# Patient Record
Sex: MALE | NOT HISPANIC OR LATINO
[De-identification: names, ages, dates, MRNs, and addresses within clinical notes are randomized per-mention and may not be internally consistent; named-entity substitution may affect disease eponyms.]

---

## 2017-01-03 ENCOUNTER — RX RENEWAL (OUTPATIENT)
Age: 62
End: 2017-01-03

## 2017-04-13 ENCOUNTER — APPOINTMENT (OUTPATIENT)
Dept: ENDOCRINOLOGY | Facility: CLINIC | Age: 62
End: 2017-04-13

## 2017-04-13 VITALS
BODY MASS INDEX: 28.44 KG/M2 | SYSTOLIC BLOOD PRESSURE: 124 MMHG | DIASTOLIC BLOOD PRESSURE: 88 MMHG | HEIGHT: 72 IN | OXYGEN SATURATION: 98 % | WEIGHT: 210 LBS | HEART RATE: 88 BPM

## 2017-04-13 LAB
GLUCOSE BLDC GLUCOMTR-MCNC: 136
HBA1C MFR BLD HPLC: 6.8

## 2017-06-28 ENCOUNTER — RX RENEWAL (OUTPATIENT)
Age: 62
End: 2017-06-28

## 2017-07-17 ENCOUNTER — RX RENEWAL (OUTPATIENT)
Age: 62
End: 2017-07-17

## 2017-08-17 ENCOUNTER — RX RENEWAL (OUTPATIENT)
Age: 62
End: 2017-08-17

## 2017-08-17 ENCOUNTER — APPOINTMENT (OUTPATIENT)
Dept: ENDOCRINOLOGY | Facility: CLINIC | Age: 62
End: 2017-08-17
Payer: COMMERCIAL

## 2017-08-17 VITALS
OXYGEN SATURATION: 98 % | WEIGHT: 208 LBS | HEIGHT: 72 IN | SYSTOLIC BLOOD PRESSURE: 120 MMHG | DIASTOLIC BLOOD PRESSURE: 72 MMHG | BODY MASS INDEX: 28.17 KG/M2 | HEART RATE: 83 BPM

## 2017-08-17 DIAGNOSIS — Z83.3 FAMILY HISTORY OF DIABETES MELLITUS: ICD-10-CM

## 2017-08-17 PROCEDURE — 99214 OFFICE O/P EST MOD 30 MIN: CPT | Mod: 25

## 2017-08-17 PROCEDURE — 82962 GLUCOSE BLOOD TEST: CPT

## 2017-08-17 PROCEDURE — 83036 HEMOGLOBIN GLYCOSYLATED A1C: CPT | Mod: QW

## 2017-08-18 LAB
ALBUMIN SERPL ELPH-MCNC: 4.5 G/DL
ALP BLD-CCNC: 70 U/L
ALT SERPL-CCNC: 25 U/L
ANION GAP SERPL CALC-SCNC: 18 MMOL/L
AST SERPL-CCNC: 19 U/L
BILIRUB SERPL-MCNC: 1.1 MG/DL
BUN SERPL-MCNC: 15 MG/DL
CALCIUM SERPL-MCNC: 9.6 MG/DL
CHLORIDE SERPL-SCNC: 100 MMOL/L
CHOLEST SERPL-MCNC: 143 MG/DL
CHOLEST/HDLC SERPL: 4 RATIO
CO2 SERPL-SCNC: 20 MMOL/L
CREAT SERPL-MCNC: 0.9 MG/DL
GLUCOSE BLDC GLUCOMTR-MCNC: 215
GLUCOSE SERPL-MCNC: 148 MG/DL
HBA1C MFR BLD HPLC: 7
HDLC SERPL-MCNC: 36 MG/DL
LDLC SERPL CALC-MCNC: 83 MG/DL
POTASSIUM SERPL-SCNC: 4.5 MMOL/L
PROT SERPL-MCNC: 7.9 G/DL
SODIUM SERPL-SCNC: 138 MMOL/L
TRIGL SERPL-MCNC: 119 MG/DL

## 2017-09-07 ENCOUNTER — APPOINTMENT (OUTPATIENT)
Dept: ORTHOPEDIC SURGERY | Facility: CLINIC | Age: 62
End: 2017-09-07
Payer: COMMERCIAL

## 2017-09-07 VITALS
WEIGHT: 209 LBS | BODY MASS INDEX: 29.26 KG/M2 | HEART RATE: 83 BPM | HEIGHT: 71 IN | DIASTOLIC BLOOD PRESSURE: 83 MMHG | SYSTOLIC BLOOD PRESSURE: 127 MMHG

## 2017-09-07 DIAGNOSIS — M47.816 SPONDYLOSIS W/OUT MYELOPATHY OR RADICULOPATHY, LUMBAR REGION: ICD-10-CM

## 2017-09-07 DIAGNOSIS — K81.0 ACUTE CHOLECYSTITIS: ICD-10-CM

## 2017-09-07 DIAGNOSIS — M16.12 UNILATERAL PRIMARY OSTEOARTHRITIS, LEFT HIP: ICD-10-CM

## 2017-09-07 PROCEDURE — 73502 X-RAY EXAM HIP UNI 2-3 VIEWS: CPT

## 2017-09-07 PROCEDURE — 72100 X-RAY EXAM L-S SPINE 2/3 VWS: CPT

## 2017-09-07 PROCEDURE — 99204 OFFICE O/P NEW MOD 45 MIN: CPT

## 2017-10-03 ENCOUNTER — OUTPATIENT (OUTPATIENT)
Dept: OUTPATIENT SERVICES | Facility: HOSPITAL | Age: 62
LOS: 1 days | End: 2017-10-03
Payer: COMMERCIAL

## 2017-10-03 VITALS
SYSTOLIC BLOOD PRESSURE: 143 MMHG | TEMPERATURE: 98 F | DIASTOLIC BLOOD PRESSURE: 79 MMHG | RESPIRATION RATE: 20 BRPM | HEIGHT: 71 IN | WEIGHT: 205.91 LBS | HEART RATE: 71 BPM | OXYGEN SATURATION: 97 %

## 2017-10-03 DIAGNOSIS — E11.9 TYPE 2 DIABETES MELLITUS WITHOUT COMPLICATIONS: ICD-10-CM

## 2017-10-03 DIAGNOSIS — Z90.49 ACQUIRED ABSENCE OF OTHER SPECIFIED PARTS OF DIGESTIVE TRACT: Chronic | ICD-10-CM

## 2017-10-03 DIAGNOSIS — M17.12 UNILATERAL PRIMARY OSTEOARTHRITIS, LEFT KNEE: ICD-10-CM

## 2017-10-03 DIAGNOSIS — Z01.818 ENCOUNTER FOR OTHER PREPROCEDURAL EXAMINATION: ICD-10-CM

## 2017-10-03 DIAGNOSIS — M16.12 UNILATERAL PRIMARY OSTEOARTHRITIS, LEFT HIP: ICD-10-CM

## 2017-10-03 DIAGNOSIS — Z98.890 OTHER SPECIFIED POSTPROCEDURAL STATES: Chronic | ICD-10-CM

## 2017-10-03 DIAGNOSIS — I10 ESSENTIAL (PRIMARY) HYPERTENSION: ICD-10-CM

## 2017-10-03 DIAGNOSIS — Z96.649 PRESENCE OF UNSPECIFIED ARTIFICIAL HIP JOINT: Chronic | ICD-10-CM

## 2017-10-03 LAB
ANION GAP SERPL CALC-SCNC: 14 MMOL/L — SIGNIFICANT CHANGE UP (ref 5–17)
BLD GP AB SCN SERPL QL: NEGATIVE — SIGNIFICANT CHANGE UP
BUN SERPL-MCNC: 16 MG/DL — SIGNIFICANT CHANGE UP (ref 7–23)
CALCIUM SERPL-MCNC: 9.6 MG/DL — SIGNIFICANT CHANGE UP (ref 8.4–10.5)
CHLORIDE SERPL-SCNC: 101 MMOL/L — SIGNIFICANT CHANGE UP (ref 96–108)
CO2 SERPL-SCNC: 22 MMOL/L — SIGNIFICANT CHANGE UP (ref 22–31)
CREAT SERPL-MCNC: 1.13 MG/DL — SIGNIFICANT CHANGE UP (ref 0.5–1.3)
GLUCOSE SERPL-MCNC: 216 MG/DL — HIGH (ref 70–99)
HBA1C BLD-MCNC: 7.5 % — HIGH (ref 4–5.6)
HCT VFR BLD CALC: 43.2 % — SIGNIFICANT CHANGE UP (ref 39–50)
HGB BLD-MCNC: 14.7 G/DL — SIGNIFICANT CHANGE UP (ref 13–17)
MCHC RBC-ENTMCNC: 28.8 PG — SIGNIFICANT CHANGE UP (ref 27–34)
MCHC RBC-ENTMCNC: 34 GM/DL — SIGNIFICANT CHANGE UP (ref 32–36)
MCV RBC AUTO: 84.7 FL — SIGNIFICANT CHANGE UP (ref 80–100)
MRSA PCR RESULT.: SIGNIFICANT CHANGE UP
PLATELET # BLD AUTO: 249 K/UL — SIGNIFICANT CHANGE UP (ref 150–400)
POTASSIUM SERPL-MCNC: 4 MMOL/L — SIGNIFICANT CHANGE UP (ref 3.5–5.3)
POTASSIUM SERPL-SCNC: 4 MMOL/L — SIGNIFICANT CHANGE UP (ref 3.5–5.3)
RBC # BLD: 5.1 M/UL — SIGNIFICANT CHANGE UP (ref 4.2–5.8)
RBC # FLD: 12.3 % — SIGNIFICANT CHANGE UP (ref 10.3–14.5)
RH IG SCN BLD-IMP: POSITIVE — SIGNIFICANT CHANGE UP
S AUREUS DNA NOSE QL NAA+PROBE: SIGNIFICANT CHANGE UP
SODIUM SERPL-SCNC: 137 MMOL/L — SIGNIFICANT CHANGE UP (ref 135–145)
WBC # BLD: 5.54 K/UL — SIGNIFICANT CHANGE UP (ref 3.8–10.5)
WBC # FLD AUTO: 5.54 K/UL — SIGNIFICANT CHANGE UP (ref 3.8–10.5)

## 2017-10-03 PROCEDURE — 85027 COMPLETE CBC AUTOMATED: CPT

## 2017-10-03 PROCEDURE — 86850 RBC ANTIBODY SCREEN: CPT

## 2017-10-03 PROCEDURE — 80048 BASIC METABOLIC PNL TOTAL CA: CPT

## 2017-10-03 PROCEDURE — 87641 MR-STAPH DNA AMP PROBE: CPT

## 2017-10-03 PROCEDURE — G0463: CPT

## 2017-10-03 PROCEDURE — 86900 BLOOD TYPING SEROLOGIC ABO: CPT

## 2017-10-03 PROCEDURE — 83036 HEMOGLOBIN GLYCOSYLATED A1C: CPT

## 2017-10-03 PROCEDURE — 87640 STAPH A DNA AMP PROBE: CPT

## 2017-10-03 PROCEDURE — 86901 BLOOD TYPING SEROLOGIC RH(D): CPT

## 2017-10-03 RX ORDER — SODIUM CHLORIDE 9 MG/ML
3 INJECTION INTRAMUSCULAR; INTRAVENOUS; SUBCUTANEOUS EVERY 8 HOURS
Qty: 0 | Refills: 0 | Status: DISCONTINUED | OUTPATIENT
Start: 2017-10-21 | End: 2017-10-21

## 2017-10-03 RX ORDER — TRAMADOL HYDROCHLORIDE 50 MG/1
50 TABLET ORAL ONCE
Qty: 0 | Refills: 0 | Status: DISCONTINUED | OUTPATIENT
Start: 2017-10-21 | End: 2017-10-21

## 2017-10-03 RX ORDER — PANTOPRAZOLE SODIUM 20 MG/1
40 TABLET, DELAYED RELEASE ORAL ONCE
Qty: 0 | Refills: 0 | Status: COMPLETED | OUTPATIENT
Start: 2017-10-21 | End: 2017-10-21

## 2017-10-03 RX ORDER — GABAPENTIN 400 MG/1
300 CAPSULE ORAL DAILY
Qty: 0 | Refills: 0 | Status: DISCONTINUED | OUTPATIENT
Start: 2017-10-21 | End: 2017-10-21

## 2017-10-03 RX ORDER — CEFAZOLIN SODIUM 1 G
2000 VIAL (EA) INJECTION ONCE
Qty: 0 | Refills: 0 | Status: DISCONTINUED | OUTPATIENT
Start: 2017-10-21 | End: 2017-10-23

## 2017-10-03 RX ORDER — ACETAMINOPHEN 500 MG
975 TABLET ORAL ONCE
Qty: 0 | Refills: 0 | Status: COMPLETED | OUTPATIENT
Start: 2017-10-21 | End: 2017-10-21

## 2017-10-03 NOTE — H&P PST ADULT - PROBLEM SELECTOR PLAN 1
Total left Hip Replacement  Pre- Op instructions discussed  CBC,BMP,Type and Screen,HgA1c,MRSA/MSSA  Pre- emptive ordered

## 2017-10-03 NOTE — H&P PST ADULT - HISTORY OF PRESENT ILLNESS
61 yo male with  PMH of HTN, HLD , DM Type 2 ( last HgA1c - 7.0 ), OA ( h/o Rt Hip replacement ). Pt c/o left hip pain ,worsening over the past few months, Currently his pain is 7/10 the scale of 0-10. Presents to PST for scheduled Left Anterior total Hip Replacement on 10/21/2017.

## 2017-10-03 NOTE — H&P PST ADULT - RS GEN PE MLT RESP DETAILS PC
clear to auscultation bilaterally/airway patent/good air movement/normal/respirations non-labored/breath sounds equal

## 2017-10-03 NOTE — H&P PST ADULT - PSH
History of hip replacement  Right- 2009  S/P cholecystectomy  laparoscopic - 2016  S/P inguinal hernia repair  2008

## 2017-10-03 NOTE — H&P PST ADULT - PMH
HLD (hyperlipidemia)    Hypertension    Type 2 diabetes mellitus    Unilateral primary osteoarthritis, left hip

## 2017-10-03 NOTE — H&P PST ADULT - NSANTHOSAYNRD_GEN_A_CORE
No. SIERRA screening performed.  STOP BANG Legend: 0-2 = LOW Risk; 3-4 = INTERMEDIATE Risk; 5-8 = HIGH Risk

## 2017-10-18 ENCOUNTER — OTHER (OUTPATIENT)
Age: 62
End: 2017-10-18

## 2017-10-20 ENCOUNTER — FORM ENCOUNTER (OUTPATIENT)
Age: 62
End: 2017-10-20

## 2017-10-21 ENCOUNTER — INPATIENT (INPATIENT)
Facility: HOSPITAL | Age: 62
LOS: 1 days | Discharge: HOME | DRG: 470 | End: 2017-10-23
Attending: ORTHOPAEDIC SURGERY | Admitting: ORTHOPAEDIC SURGERY
Payer: COMMERCIAL

## 2017-10-21 ENCOUNTER — APPOINTMENT (OUTPATIENT)
Dept: ORTHOPEDIC SURGERY | Facility: HOSPITAL | Age: 62
End: 2017-10-21

## 2017-10-21 ENCOUNTER — RESULT REVIEW (OUTPATIENT)
Age: 62
End: 2017-10-21

## 2017-10-21 VITALS
HEART RATE: 73 BPM | TEMPERATURE: 98 F | WEIGHT: 205.91 LBS | RESPIRATION RATE: 18 BRPM | HEIGHT: 71 IN | DIASTOLIC BLOOD PRESSURE: 86 MMHG | SYSTOLIC BLOOD PRESSURE: 136 MMHG | OXYGEN SATURATION: 97 %

## 2017-10-21 DIAGNOSIS — Z96.649 PRESENCE OF UNSPECIFIED ARTIFICIAL HIP JOINT: Chronic | ICD-10-CM

## 2017-10-21 DIAGNOSIS — Z98.890 OTHER SPECIFIED POSTPROCEDURAL STATES: Chronic | ICD-10-CM

## 2017-10-21 DIAGNOSIS — M17.12 UNILATERAL PRIMARY OSTEOARTHRITIS, LEFT KNEE: ICD-10-CM

## 2017-10-21 DIAGNOSIS — Z90.49 ACQUIRED ABSENCE OF OTHER SPECIFIED PARTS OF DIGESTIVE TRACT: Chronic | ICD-10-CM

## 2017-10-21 LAB
ANION GAP SERPL CALC-SCNC: 10 MMOL/L — SIGNIFICANT CHANGE UP (ref 5–17)
BUN SERPL-MCNC: 15 MG/DL — SIGNIFICANT CHANGE UP (ref 7–23)
CALCIUM SERPL-MCNC: 8.1 MG/DL — LOW (ref 8.4–10.5)
CHLORIDE SERPL-SCNC: 101 MMOL/L — SIGNIFICANT CHANGE UP (ref 96–108)
CO2 SERPL-SCNC: 24 MMOL/L — SIGNIFICANT CHANGE UP (ref 22–31)
CREAT SERPL-MCNC: 0.79 MG/DL — SIGNIFICANT CHANGE UP (ref 0.5–1.3)
GLUCOSE BLDC GLUCOMTR-MCNC: 135 MG/DL — HIGH (ref 70–99)
GLUCOSE BLDC GLUCOMTR-MCNC: 209 MG/DL — HIGH (ref 70–99)
GLUCOSE BLDC GLUCOMTR-MCNC: 287 MG/DL — HIGH (ref 70–99)
GLUCOSE SERPL-MCNC: 201 MG/DL — HIGH (ref 70–99)
HCT VFR BLD CALC: 39.2 % — SIGNIFICANT CHANGE UP (ref 39–50)
HGB BLD-MCNC: 13.4 G/DL — SIGNIFICANT CHANGE UP (ref 13–17)
MCHC RBC-ENTMCNC: 30.9 PG — SIGNIFICANT CHANGE UP (ref 27–34)
MCHC RBC-ENTMCNC: 34.2 GM/DL — SIGNIFICANT CHANGE UP (ref 32–36)
MCV RBC AUTO: 90.3 FL — SIGNIFICANT CHANGE UP (ref 80–100)
PLATELET # BLD AUTO: 220 K/UL — SIGNIFICANT CHANGE UP (ref 150–400)
POTASSIUM SERPL-MCNC: 4.7 MMOL/L — SIGNIFICANT CHANGE UP (ref 3.5–5.3)
POTASSIUM SERPL-SCNC: 4.7 MMOL/L — SIGNIFICANT CHANGE UP (ref 3.5–5.3)
RBC # BLD: 4.35 M/UL — SIGNIFICANT CHANGE UP (ref 4.2–5.8)
RBC # FLD: 11.3 % — SIGNIFICANT CHANGE UP (ref 10.3–14.5)
SODIUM SERPL-SCNC: 135 MMOL/L — SIGNIFICANT CHANGE UP (ref 135–145)
WBC # BLD: 5.6 K/UL — SIGNIFICANT CHANGE UP (ref 3.8–10.5)
WBC # FLD AUTO: 5.6 K/UL — SIGNIFICANT CHANGE UP (ref 3.8–10.5)

## 2017-10-21 PROCEDURE — 27130 TOTAL HIP ARTHROPLASTY: CPT | Mod: LT

## 2017-10-21 PROCEDURE — 88311 DECALCIFY TISSUE: CPT | Mod: 26

## 2017-10-21 PROCEDURE — 88304 TISSUE EXAM BY PATHOLOGIST: CPT | Mod: 26

## 2017-10-21 PROCEDURE — 72170 X-RAY EXAM OF PELVIS: CPT | Mod: 26

## 2017-10-21 RX ORDER — CEFAZOLIN SODIUM 1 G
2000 VIAL (EA) INJECTION EVERY 6 HOURS
Qty: 0 | Refills: 0 | Status: COMPLETED | OUTPATIENT
Start: 2017-10-21 | End: 2017-10-21

## 2017-10-21 RX ORDER — DEXTROSE 50 % IN WATER 50 %
1 SYRINGE (ML) INTRAVENOUS ONCE
Qty: 0 | Refills: 0 | Status: DISCONTINUED | OUTPATIENT
Start: 2017-10-21 | End: 2017-10-23

## 2017-10-21 RX ORDER — KETOROLAC TROMETHAMINE 30 MG/ML
15 SYRINGE (ML) INJECTION EVERY 8 HOURS
Qty: 0 | Refills: 0 | Status: DISCONTINUED | OUTPATIENT
Start: 2017-10-22 | End: 2017-10-22

## 2017-10-21 RX ORDER — DEXTROSE 50 % IN WATER 50 %
25 SYRINGE (ML) INTRAVENOUS ONCE
Qty: 0 | Refills: 0 | Status: DISCONTINUED | OUTPATIENT
Start: 2017-10-21 | End: 2017-10-23

## 2017-10-21 RX ORDER — INSULIN LISPRO 100/ML
VIAL (ML) SUBCUTANEOUS
Qty: 0 | Refills: 0 | Status: DISCONTINUED | OUTPATIENT
Start: 2017-10-21 | End: 2017-10-23

## 2017-10-21 RX ORDER — DOCUSATE SODIUM 100 MG
100 CAPSULE ORAL THREE TIMES A DAY
Qty: 0 | Refills: 0 | Status: DISCONTINUED | OUTPATIENT
Start: 2017-10-21 | End: 2017-10-23

## 2017-10-21 RX ORDER — GLUCAGON INJECTION, SOLUTION 0.5 MG/.1ML
1 INJECTION, SOLUTION SUBCUTANEOUS ONCE
Qty: 0 | Refills: 0 | Status: DISCONTINUED | OUTPATIENT
Start: 2017-10-21 | End: 2017-10-23

## 2017-10-21 RX ORDER — SIMVASTATIN 20 MG/1
10 TABLET, FILM COATED ORAL AT BEDTIME
Qty: 0 | Refills: 0 | Status: DISCONTINUED | OUTPATIENT
Start: 2017-10-21 | End: 2017-10-23

## 2017-10-21 RX ORDER — INFLUENZA VIRUS VACCINE 15; 15; 15; 15 UG/.5ML; UG/.5ML; UG/.5ML; UG/.5ML
0.5 SUSPENSION INTRAMUSCULAR ONCE
Qty: 0 | Refills: 0 | Status: DISCONTINUED | OUTPATIENT
Start: 2017-10-21 | End: 2017-10-23

## 2017-10-21 RX ORDER — OXYCODONE HYDROCHLORIDE 5 MG/1
10 TABLET ORAL EVERY 4 HOURS
Qty: 0 | Refills: 0 | Status: DISCONTINUED | OUTPATIENT
Start: 2017-10-21 | End: 2017-10-23

## 2017-10-21 RX ORDER — DEXTROSE 50 % IN WATER 50 %
12.5 SYRINGE (ML) INTRAVENOUS ONCE
Qty: 0 | Refills: 0 | Status: DISCONTINUED | OUTPATIENT
Start: 2017-10-21 | End: 2017-10-23

## 2017-10-21 RX ORDER — SODIUM CHLORIDE 9 MG/ML
1000 INJECTION, SOLUTION INTRAVENOUS
Qty: 0 | Refills: 0 | Status: DISCONTINUED | OUTPATIENT
Start: 2017-10-21 | End: 2017-10-23

## 2017-10-21 RX ORDER — METFORMIN HYDROCHLORIDE 850 MG/1
1 TABLET ORAL
Qty: 0 | Refills: 0 | COMMUNITY

## 2017-10-21 RX ORDER — ASPIRIN/CALCIUM CARB/MAGNESIUM 324 MG
325 TABLET ORAL
Qty: 0 | Refills: 0 | Status: DISCONTINUED | OUTPATIENT
Start: 2017-10-21 | End: 2017-10-23

## 2017-10-21 RX ORDER — PANTOPRAZOLE SODIUM 20 MG/1
40 TABLET, DELAYED RELEASE ORAL DAILY
Qty: 0 | Refills: 0 | Status: DISCONTINUED | OUTPATIENT
Start: 2017-10-21 | End: 2017-10-23

## 2017-10-21 RX ORDER — INSULIN LISPRO 100/ML
VIAL (ML) SUBCUTANEOUS AT BEDTIME
Qty: 0 | Refills: 0 | Status: DISCONTINUED | OUTPATIENT
Start: 2017-10-21 | End: 2017-10-23

## 2017-10-21 RX ORDER — OXYCODONE HYDROCHLORIDE 5 MG/1
5 TABLET ORAL EVERY 4 HOURS
Qty: 0 | Refills: 0 | Status: DISCONTINUED | OUTPATIENT
Start: 2017-10-21 | End: 2017-10-23

## 2017-10-21 RX ORDER — TRAMADOL HYDROCHLORIDE 50 MG/1
50 TABLET ORAL EVERY 8 HOURS
Qty: 0 | Refills: 0 | Status: DISCONTINUED | OUTPATIENT
Start: 2017-10-21 | End: 2017-10-23

## 2017-10-21 RX ORDER — SIMVASTATIN 20 MG/1
1 TABLET, FILM COATED ORAL
Qty: 0 | Refills: 0 | COMMUNITY

## 2017-10-21 RX ORDER — CELECOXIB 200 MG/1
200 CAPSULE ORAL
Qty: 0 | Refills: 0 | Status: DISCONTINUED | OUTPATIENT
Start: 2017-10-23 | End: 2017-10-23

## 2017-10-21 RX ORDER — ONDANSETRON 8 MG/1
4 TABLET, FILM COATED ORAL EVERY 6 HOURS
Qty: 0 | Refills: 0 | Status: DISCONTINUED | OUTPATIENT
Start: 2017-10-21 | End: 2017-10-23

## 2017-10-21 RX ORDER — ACETAMINOPHEN 500 MG
650 TABLET ORAL EVERY 8 HOURS
Qty: 0 | Refills: 0 | Status: DISCONTINUED | OUTPATIENT
Start: 2017-10-21 | End: 2017-10-23

## 2017-10-21 RX ORDER — SENNA PLUS 8.6 MG/1
2 TABLET ORAL AT BEDTIME
Qty: 0 | Refills: 0 | Status: DISCONTINUED | OUTPATIENT
Start: 2017-10-21 | End: 2017-10-23

## 2017-10-21 RX ORDER — SODIUM CHLORIDE 9 MG/ML
1000 INJECTION, SOLUTION INTRAVENOUS ONCE
Qty: 0 | Refills: 0 | Status: COMPLETED | OUTPATIENT
Start: 2017-10-21 | End: 2017-10-22

## 2017-10-21 RX ORDER — HYDROMORPHONE HYDROCHLORIDE 2 MG/ML
0.5 INJECTION INTRAMUSCULAR; INTRAVENOUS; SUBCUTANEOUS EVERY 4 HOURS
Qty: 0 | Refills: 0 | Status: DISCONTINUED | OUTPATIENT
Start: 2017-10-21 | End: 2017-10-23

## 2017-10-21 RX ORDER — LIDOCAINE HCL 20 MG/ML
0.2 VIAL (ML) INJECTION ONCE
Qty: 0 | Refills: 0 | Status: DISCONTINUED | OUTPATIENT
Start: 2017-10-21 | End: 2017-10-21

## 2017-10-21 RX ORDER — SODIUM CHLORIDE 9 MG/ML
1000 INJECTION, SOLUTION INTRAVENOUS ONCE
Qty: 0 | Refills: 0 | Status: COMPLETED | OUTPATIENT
Start: 2017-10-21 | End: 2017-10-21

## 2017-10-21 RX ORDER — FERROUS SULFATE 325(65) MG
325 TABLET ORAL DAILY
Qty: 0 | Refills: 0 | Status: DISCONTINUED | OUTPATIENT
Start: 2017-10-21 | End: 2017-10-23

## 2017-10-21 RX ADMIN — GABAPENTIN 300 MILLIGRAM(S): 400 CAPSULE ORAL at 06:14

## 2017-10-21 RX ADMIN — Medication 975 MILLIGRAM(S): at 06:14

## 2017-10-21 RX ADMIN — SODIUM CHLORIDE 1000 MILLILITER(S): 9 INJECTION, SOLUTION INTRAVENOUS at 11:24

## 2017-10-21 RX ADMIN — SIMVASTATIN 10 MILLIGRAM(S): 20 TABLET, FILM COATED ORAL at 21:38

## 2017-10-21 RX ADMIN — TRAMADOL HYDROCHLORIDE 50 MILLIGRAM(S): 50 TABLET ORAL at 07:41

## 2017-10-21 RX ADMIN — Medication 100 MILLIGRAM(S): at 21:38

## 2017-10-21 RX ADMIN — Medication 5 MILLIGRAM(S): at 17:15

## 2017-10-21 RX ADMIN — Medication 3: at 17:14

## 2017-10-21 RX ADMIN — Medication 1 TABLET(S): at 17:15

## 2017-10-21 RX ADMIN — TRAMADOL HYDROCHLORIDE 50 MILLIGRAM(S): 50 TABLET ORAL at 17:15

## 2017-10-21 RX ADMIN — Medication 100 MILLIGRAM(S): at 13:53

## 2017-10-21 RX ADMIN — PANTOPRAZOLE SODIUM 40 MILLIGRAM(S): 20 TABLET, DELAYED RELEASE ORAL at 06:14

## 2017-10-21 RX ADMIN — Medication 2: at 12:00

## 2017-10-21 RX ADMIN — SODIUM CHLORIDE 3 MILLILITER(S): 9 INJECTION INTRAMUSCULAR; INTRAVENOUS; SUBCUTANEOUS at 06:16

## 2017-10-21 RX ADMIN — Medication 325 MILLIGRAM(S): at 21:38

## 2017-10-21 NOTE — PHYSICAL THERAPY INITIAL EVALUATION ADULT - ADDITIONAL COMMENTS
62 year old male who PTA was living in a 2nd floor apartment,. reports 5 steps to enter building, and additional 15 steps to 2nd floor + hand rails living with spouse,. PTA pt was independent in all functional mobility and all ADLs, no AD.

## 2017-10-21 NOTE — CHART NOTE - NSCHARTNOTEFT_GEN_A_CORE
10-21-17 @ 11:56    Pt comfortable.  Pain well controlled.  No SOB, No C/P, No N/V.  T(C): 36.5 (10-21-17 @ 10:20), Max: 36.6 (10-21-17 @ 05:58)  HR: 62 (10-21-17 @ 11:45) (62 - 80)  BP: 133/84 (10-21-17 @ 11:45) (107/59 - 138/83)  RR: 17 (10-21-17 @ 11:45) (14 - 18)  SpO2: 98% (10-21-17 @ 11:45) (97% - 100%)    Left hip dressing clean/dry/intact.  +DF/PF.  +Distal Pulses.   Motor/Sensory function grossly intact.  Calves soft/NT with venodynes  intact.      Postop Xrays show good hardware alignment.    Pt s/p L GAGANDEEP  -Analgesia  -Cont to Monitor  -DVT Prophylaxis  -OOB w/ PT    ALFREDA PalaciosC  [id number]

## 2017-10-21 NOTE — PHYSICAL THERAPY INITIAL EVALUATION ADULT - CRITERIA FOR SKILLED THERAPEUTIC INTERVENTIONS
risk reduction/prevention/anticipated discharge recommendation/functional limitations in following categories/anticipated equipment needs at discharge/impairments found/therapy frequency/rehab potential/predicted duration of therapy intervention

## 2017-10-21 NOTE — BRIEF OPERATIVE NOTE - PROCEDURE
<<-----Click on this checkbox to enter Procedure Total hip replacement  10/21/2017  LEFT ANTERIOR TOTAL HIP REPLACEMENT  Active  AVARGAS2

## 2017-10-22 LAB
ANION GAP SERPL CALC-SCNC: 12 MMOL/L — SIGNIFICANT CHANGE UP (ref 5–17)
BUN SERPL-MCNC: 13 MG/DL — SIGNIFICANT CHANGE UP (ref 7–23)
CALCIUM SERPL-MCNC: 8.2 MG/DL — LOW (ref 8.4–10.5)
CHLORIDE SERPL-SCNC: 99 MMOL/L — SIGNIFICANT CHANGE UP (ref 96–108)
CO2 SERPL-SCNC: 22 MMOL/L — SIGNIFICANT CHANGE UP (ref 22–31)
CREAT SERPL-MCNC: 0.69 MG/DL — SIGNIFICANT CHANGE UP (ref 0.5–1.3)
GLUCOSE BLDC GLUCOMTR-MCNC: 167 MG/DL — HIGH (ref 70–99)
GLUCOSE BLDC GLUCOMTR-MCNC: 173 MG/DL — HIGH (ref 70–99)
GLUCOSE BLDC GLUCOMTR-MCNC: 181 MG/DL — HIGH (ref 70–99)
GLUCOSE BLDC GLUCOMTR-MCNC: 190 MG/DL — HIGH (ref 70–99)
GLUCOSE SERPL-MCNC: 189 MG/DL — HIGH (ref 70–99)
HCT VFR BLD CALC: 32.3 % — LOW (ref 39–50)
HGB BLD-MCNC: 11.1 G/DL — LOW (ref 13–17)
MCHC RBC-ENTMCNC: 29.4 PG — SIGNIFICANT CHANGE UP (ref 27–34)
MCHC RBC-ENTMCNC: 34.4 GM/DL — SIGNIFICANT CHANGE UP (ref 32–36)
MCV RBC AUTO: 85.4 FL — SIGNIFICANT CHANGE UP (ref 80–100)
PLATELET # BLD AUTO: 202 K/UL — SIGNIFICANT CHANGE UP (ref 150–400)
POTASSIUM SERPL-MCNC: 3.9 MMOL/L — SIGNIFICANT CHANGE UP (ref 3.5–5.3)
POTASSIUM SERPL-SCNC: 3.9 MMOL/L — SIGNIFICANT CHANGE UP (ref 3.5–5.3)
RBC # BLD: 3.78 M/UL — LOW (ref 4.2–5.8)
RBC # FLD: 12.1 % — SIGNIFICANT CHANGE UP (ref 10.3–14.5)
SODIUM SERPL-SCNC: 133 MMOL/L — LOW (ref 135–145)
WBC # BLD: 8.5 K/UL — SIGNIFICANT CHANGE UP (ref 3.8–10.5)
WBC # FLD AUTO: 8.5 K/UL — SIGNIFICANT CHANGE UP (ref 3.8–10.5)

## 2017-10-22 RX ADMIN — Medication 100 MILLIGRAM(S): at 21:03

## 2017-10-22 RX ADMIN — Medication 1 TABLET(S): at 04:56

## 2017-10-22 RX ADMIN — Medication 1 TABLET(S): at 17:40

## 2017-10-22 RX ADMIN — TRAMADOL HYDROCHLORIDE 50 MILLIGRAM(S): 50 TABLET ORAL at 09:00

## 2017-10-22 RX ADMIN — TRAMADOL HYDROCHLORIDE 50 MILLIGRAM(S): 50 TABLET ORAL at 08:14

## 2017-10-22 RX ADMIN — OXYCODONE HYDROCHLORIDE 5 MILLIGRAM(S): 5 TABLET ORAL at 05:25

## 2017-10-22 RX ADMIN — PANTOPRAZOLE SODIUM 40 MILLIGRAM(S): 20 TABLET, DELAYED RELEASE ORAL at 12:08

## 2017-10-22 RX ADMIN — SIMVASTATIN 10 MILLIGRAM(S): 20 TABLET, FILM COATED ORAL at 21:03

## 2017-10-22 RX ADMIN — Medication 15 MILLIGRAM(S): at 15:30

## 2017-10-22 RX ADMIN — OXYCODONE HYDROCHLORIDE 5 MILLIGRAM(S): 5 TABLET ORAL at 21:05

## 2017-10-22 RX ADMIN — Medication 5 MILLIGRAM(S): at 17:40

## 2017-10-22 RX ADMIN — TRAMADOL HYDROCHLORIDE 50 MILLIGRAM(S): 50 TABLET ORAL at 17:40

## 2017-10-22 RX ADMIN — Medication 5 MILLIGRAM(S): at 04:56

## 2017-10-22 RX ADMIN — OXYCODONE HYDROCHLORIDE 5 MILLIGRAM(S): 5 TABLET ORAL at 04:55

## 2017-10-22 RX ADMIN — TRAMADOL HYDROCHLORIDE 50 MILLIGRAM(S): 50 TABLET ORAL at 18:15

## 2017-10-22 RX ADMIN — SODIUM CHLORIDE 1000 MILLILITER(S): 9 INJECTION, SOLUTION INTRAVENOUS at 04:57

## 2017-10-22 RX ADMIN — Medication 100 MILLIGRAM(S): at 14:29

## 2017-10-22 RX ADMIN — Medication 1: at 08:18

## 2017-10-22 RX ADMIN — Medication 325 MILLIGRAM(S): at 04:57

## 2017-10-22 RX ADMIN — SENNA PLUS 2 TABLET(S): 8.6 TABLET ORAL at 21:03

## 2017-10-22 RX ADMIN — TRAMADOL HYDROCHLORIDE 50 MILLIGRAM(S): 50 TABLET ORAL at 01:20

## 2017-10-22 RX ADMIN — Medication 15 MILLIGRAM(S): at 21:33

## 2017-10-22 RX ADMIN — Medication 15 MILLIGRAM(S): at 05:30

## 2017-10-22 RX ADMIN — TRAMADOL HYDROCHLORIDE 50 MILLIGRAM(S): 50 TABLET ORAL at 00:49

## 2017-10-22 RX ADMIN — Medication 1: at 18:03

## 2017-10-22 RX ADMIN — Medication 325 MILLIGRAM(S): at 12:07

## 2017-10-22 RX ADMIN — Medication 325 MILLIGRAM(S): at 17:40

## 2017-10-22 RX ADMIN — Medication 15 MILLIGRAM(S): at 14:29

## 2017-10-22 RX ADMIN — Medication 100 MILLIGRAM(S): at 04:56

## 2017-10-22 RX ADMIN — OXYCODONE HYDROCHLORIDE 5 MILLIGRAM(S): 5 TABLET ORAL at 21:31

## 2017-10-22 RX ADMIN — Medication 15 MILLIGRAM(S): at 21:03

## 2017-10-22 RX ADMIN — Medication 15 MILLIGRAM(S): at 04:56

## 2017-10-22 RX ADMIN — Medication 1: at 12:07

## 2017-10-22 NOTE — CONSULT NOTE ADULT - ASSESSMENT
Patient is a 62y old  Male with s/p  left total hip replacement".    Lt THR:  WBAT  Pain control - Adequate.  DVT prophylaxis  BID  Bowel regimen.  Ortho f/up noted.    HTN:  Enalapril    DM II:  FSSS    HLD:  Zocor

## 2017-10-22 NOTE — PROGRESS NOTE ADULT - SUBJECTIVE AND OBJECTIVE BOX
10-22-17 @ 09:51    Pt comfortable.  Pain well controlled.  No overnight events.    T(C): 37.2 (10-22-17 @ 04:53), Max: 37.2 (10-22-17 @ 04:53)  HR: 84 (10-22-17 @ 09:02) (60 - 94)  BP: 127/74 (10-22-17 @ 09:02) (107/59 - 173/86)  RR: 16 (10-22-17 @ 04:53) (14 - 19)  SpO2: 94% (10-22-17 @ 04:53) (94% - 100%)    L Hip dressing clean/dry/intact.  +DF/PF.  +Distal Pulses.  Motor/Sensory function grossly intact.  Calves soft/NT with Venodynes  intact.

## 2017-10-22 NOTE — OCCUPATIONAL THERAPY INITIAL EVALUATION ADULT - PERTINENT HX OF CURRENT PROBLEM, REHAB EVAL
63 yo male with  PMH of HTN, HLD , DM Type 2 ( last HgA1c - 7.0 ), OA ( h/o Rt Hip replacement ). Pt c/o left hip pain ,worsening over the past few months, Currently his pain is 7/10 the scale of 0-10. s/p Left Anterior total Hip Replacement on 10/21/2017

## 2017-10-22 NOTE — OCCUPATIONAL THERAPY INITIAL EVALUATION ADULT - ANTICIPATED DISCHARGE DISPOSITION, OT EVAL
Home with home OT for home safety evaluation, functional mobility, ADLs and balance. Home with supervision/assist for all functional mobility and ADLs.

## 2017-10-22 NOTE — CONSULT NOTE ADULT - SUBJECTIVE AND OBJECTIVE BOX
Patient is a 62y old  Male who presents with a chief complaint of "I am having my left hip replacement".      HPI:  61 yo male with  PMH of HTN, HLD , DM Type 2 ( last HgA1c - 7.0 ), OA ( h/o Rt Hip replacement ). Pt c/o left hip pain ,worsening over the past few months, Currently his pain is 7/10 the scale of 0-10. Presents to [facility] for scheduled Left Anterior total Hip Replacement on 10/21/2017. (03 Oct 2017 09:13)      PAST MEDICAL & SURGICAL HISTORY:  Unilateral primary osteoarthritis, left hip  HLD (hyperlipidemia)  Type 2 diabetes mellitus  Hypertension  S/P cholecystectomy: laparoscopic - 2016  S/P inguinal hernia repair: 2008  History of hip replacement: Right- 2009      Review of Systems:   CONSTITUTIONAL: No fever, weight loss, or fatigue  EYES: No eye pain, visual disturbances, or discharge  ENMT:  No difficulty hearing, tinnitus, vertigo; No sinus or throat pain  NECK: No pain or stiffness  RESPIRATORY: No cough, wheezing, chills or hemoptysis; No shortness of breath  CARDIOVASCULAR: No chest pain, palpitations, dizziness, or leg swelling  GASTROINTESTINAL: No abdominal or epigastric pain. No nausea, vomiting, or hematemesis; No diarrhea or constipation. No melena or hematochezia.  NEUROLOGICAL: No headaches, memory loss, loss of strength, numbness, or tremors  SKIN: No itching, burning, rashes, or lesions   MUSCULOSKELETAL: No joint pain or swelling; No muscle, back, or extremity pain  PSYCHIATRIC: No depression, anxiety, mood swings, or difficulty sleeping      Allergies    No Known Allergies    Intolerances        Social History:     FAMILY HISTORY:  No pertinent family history in first degree relatives      MEDICATIONS  (STANDING):  aspirin enteric coated 325 milliGRAM(s) Oral two times a day  calcium carbonate 1250 mG + Vitamin D (OsCal 500 + D) 1 Tablet(s) Oral two times a day  ceFAZolin   IVPB 2000 milliGRAM(s) IV Intermittent once  dextrose 5%. 1000 milliLiter(s) (50 mL/Hr) IV Continuous <Continuous>  dextrose 50% Injectable 12.5 Gram(s) IV Push once  dextrose 50% Injectable 25 Gram(s) IV Push once  dextrose 50% Injectable 25 Gram(s) IV Push once  docusate sodium 100 milliGRAM(s) Oral three times a day  enalapril 5 milliGRAM(s) Oral two times a day  ferrous    sulfate 325 milliGRAM(s) Oral daily  influenza   Vaccine 0.5 milliLiter(s) IntraMuscular once  insulin lispro (HumaLOG) corrective regimen sliding scale   SubCutaneous three times a day before meals  insulin lispro (HumaLOG) corrective regimen sliding scale   SubCutaneous at bedtime  ketorolac   Injectable 15 milliGRAM(s) IV Push every 8 hours  lactated ringers. 1000 milliLiter(s) (150 mL/Hr) IV Continuous <Continuous>  pantoprazole    Tablet 40 milliGRAM(s) Oral daily  simvastatin 10 milliGRAM(s) Oral at bedtime  traMADol 50 milliGRAM(s) Oral every 8 hours    MEDICATIONS  (PRN):  acetaminophen   Tablet 650 milliGRAM(s) Oral every 8 hours PRN For Temp over 38.3 C (100.94 F)  aluminum hydroxide/magnesium hydroxide/simethicone Suspension 30 milliLiter(s) Oral four times a day PRN Indigestion  dextrose Gel 1 Dose(s) Oral once PRN Blood Glucose LESS THAN 70 milliGRAM(s)/deciliter  glucagon  Injectable 1 milliGRAM(s) IntraMuscular once PRN Glucose LESS THAN 70 milligrams/deciliter  HYDROmorphone  Injectable 0.5 milliGRAM(s) IV Push every 4 hours PRN breakthrough pain  ondansetron Injectable 4 milliGRAM(s) IV Push every 6 hours PRN Nausea and/or Vomiting  oxyCODONE    IR 5 milliGRAM(s) Oral every 4 hours PRN Moderate Pain (4 - 6)  oxyCODONE    IR 10 milliGRAM(s) Oral every 4 hours PRN Severe Pain (7 - 10)  senna 2 Tablet(s) Oral at bedtime PRN Constipation      CAPILLARY BLOOD GLUCOSE  209 (21 Oct 2017 21:05)      POCT Blood Glucose.: 181 mg/dL (22 Oct 2017 17:30)  POCT Blood Glucose.: 190 mg/dL (22 Oct 2017 12:05)  POCT Blood Glucose.: 173 mg/dL (22 Oct 2017 07:41)  POCT Blood Glucose.: 209 mg/dL (21 Oct 2017 21:00)    I&O's Summary    21 Oct 2017 07:01  -  22 Oct 2017 07:00  --------------------------------------------------------  IN: 4380 mL / OUT: 1400 mL / NET: 2980 mL    22 Oct 2017 07:01  -  22 Oct 2017 19:11  --------------------------------------------------------  IN: 1600 mL / OUT: 1120 mL / NET: 480 mL        PHYSICAL EXAM:  GENERAL: NAD, well-developed  HEAD:  Atraumatic, Normocephalic  NECK: Supple, No JVD  CHEST/LUNG: Clear to auscultation bilaterally; No wheezing.  HEART: Regular rate and rhythm; No murmurs, rubs, or gallops  ABDOMEN: Soft, Nontender, Nondistended; Bowel sounds present  EXTREMITIES:  2+ Peripheral Pulses, No clubbing, cyanosis, or edema  PSYCH: AAOx3  NEUROLOGY: non-focal  SKIN: No rashes or lesions    LABS:                        11.1   8.50  )-----------( 202      ( 22 Oct 2017 08:40 )             32.3     10-22    133<L>  |  99  |  13  ----------------------------<  189<H>  3.9   |  22  |  0.69    Ca    8.2<L>      22 Oct 2017 08:41              CAPILLARY BLOOD GLUCOSE  209 (21 Oct 2017 21:05)      POCT Blood Glucose.: 181 mg/dL (22 Oct 2017 17:30)  POCT Blood Glucose.: 190 mg/dL (22 Oct 2017 12:05)  POCT Blood Glucose.: 173 mg/dL (22 Oct 2017 07:41)  POCT Blood Glucose.: 209 mg/dL (21 Oct 2017 21:00)                RADIOLOGY & ADDITIONAL TESTS:    Imaging Personally Reviewed:    Consultant(s) Notes Reviewed:      Care Discussed with Consultants/Other Providers:    Thanks for consult. Will follow.

## 2017-10-22 NOTE — PROGRESS NOTE ADULT - SUBJECTIVE AND OBJECTIVE BOX
S/B Orthopaedic Attending - Farooq Pulido MD    S/P Left THR DAA - POD #1    Afebrile  Vital Signs Stable    Left Hip: Wound Dry                        No Swelling                        Nil NVD                        Pain - controlled    PA- Soft  Calves - Soft    Plan:   1. Physical Therapy  2. WBAT Walker  3. Hip Precautions - Anterior  4. Abduction Pillow  5. Static Quads  6. Gluteal Sets  7. SCDS  8. Incentive spirometer  9. Ice Compress q4h for 20 minutes  10. Elevation - 2 pillows under heels.  11. Anticoagulation - Aspirin 325mg PO q12 x 6 weeks  12. D/C Planning - Home    13. Office Review 2 weeks postop

## 2017-10-23 ENCOUNTER — TRANSCRIPTION ENCOUNTER (OUTPATIENT)
Age: 62
End: 2017-10-23

## 2017-10-23 VITALS
SYSTOLIC BLOOD PRESSURE: 108 MMHG | TEMPERATURE: 98 F | OXYGEN SATURATION: 95 % | DIASTOLIC BLOOD PRESSURE: 70 MMHG | RESPIRATION RATE: 18 BRPM | HEART RATE: 92 BPM

## 2017-10-23 LAB
ANION GAP SERPL CALC-SCNC: 10 MMOL/L — SIGNIFICANT CHANGE UP (ref 5–17)
BUN SERPL-MCNC: 13 MG/DL — SIGNIFICANT CHANGE UP (ref 7–23)
CALCIUM SERPL-MCNC: 8.4 MG/DL — SIGNIFICANT CHANGE UP (ref 8.4–10.5)
CHLORIDE SERPL-SCNC: 98 MMOL/L — SIGNIFICANT CHANGE UP (ref 96–108)
CO2 SERPL-SCNC: 30 MMOL/L — SIGNIFICANT CHANGE UP (ref 22–31)
CREAT SERPL-MCNC: 0.78 MG/DL — SIGNIFICANT CHANGE UP (ref 0.5–1.3)
GLUCOSE BLDC GLUCOMTR-MCNC: 141 MG/DL — HIGH (ref 70–99)
GLUCOSE BLDC GLUCOMTR-MCNC: 208 MG/DL — HIGH (ref 70–99)
GLUCOSE SERPL-MCNC: 135 MG/DL — HIGH (ref 70–99)
HCT VFR BLD CALC: 34 % — LOW (ref 39–50)
HGB BLD-MCNC: 11.7 G/DL — LOW (ref 13–17)
MCHC RBC-ENTMCNC: 30.9 PG — SIGNIFICANT CHANGE UP (ref 27–34)
MCHC RBC-ENTMCNC: 34.4 GM/DL — SIGNIFICANT CHANGE UP (ref 32–36)
MCV RBC AUTO: 90 FL — SIGNIFICANT CHANGE UP (ref 80–100)
PLATELET # BLD AUTO: 187 K/UL — SIGNIFICANT CHANGE UP (ref 150–400)
POTASSIUM SERPL-MCNC: 4.4 MMOL/L — SIGNIFICANT CHANGE UP (ref 3.5–5.3)
POTASSIUM SERPL-SCNC: 4.4 MMOL/L — SIGNIFICANT CHANGE UP (ref 3.5–5.3)
RBC # BLD: 3.77 M/UL — LOW (ref 4.2–5.8)
RBC # FLD: 11.2 % — SIGNIFICANT CHANGE UP (ref 10.3–14.5)
SODIUM SERPL-SCNC: 138 MMOL/L — SIGNIFICANT CHANGE UP (ref 135–145)
WBC # BLD: 6.2 K/UL — SIGNIFICANT CHANGE UP (ref 3.8–10.5)
WBC # FLD AUTO: 6.2 K/UL — SIGNIFICANT CHANGE UP (ref 3.8–10.5)

## 2017-10-23 PROCEDURE — 82962 GLUCOSE BLOOD TEST: CPT

## 2017-10-23 PROCEDURE — 72170 X-RAY EXAM OF PELVIS: CPT

## 2017-10-23 PROCEDURE — C1889: CPT

## 2017-10-23 PROCEDURE — 85027 COMPLETE CBC AUTOMATED: CPT

## 2017-10-23 PROCEDURE — 97116 GAIT TRAINING THERAPY: CPT

## 2017-10-23 PROCEDURE — 97535 SELF CARE MNGMENT TRAINING: CPT

## 2017-10-23 PROCEDURE — 76000 FLUOROSCOPY <1 HR PHYS/QHP: CPT

## 2017-10-23 PROCEDURE — 97165 OT EVAL LOW COMPLEX 30 MIN: CPT

## 2017-10-23 PROCEDURE — 97530 THERAPEUTIC ACTIVITIES: CPT

## 2017-10-23 PROCEDURE — 97161 PT EVAL LOW COMPLEX 20 MIN: CPT

## 2017-10-23 PROCEDURE — 80048 BASIC METABOLIC PNL TOTAL CA: CPT

## 2017-10-23 PROCEDURE — 88304 TISSUE EXAM BY PATHOLOGIST: CPT

## 2017-10-23 PROCEDURE — C1776: CPT

## 2017-10-23 PROCEDURE — 97110 THERAPEUTIC EXERCISES: CPT

## 2017-10-23 PROCEDURE — 88311 DECALCIFY TISSUE: CPT

## 2017-10-23 PROCEDURE — C1713: CPT

## 2017-10-23 RX ORDER — MELOXICAM 15 MG/1
1 TABLET ORAL
Qty: 30 | Refills: 0 | OUTPATIENT
Start: 2017-10-23 | End: 2017-11-22

## 2017-10-23 RX ORDER — OXYCODONE HYDROCHLORIDE 5 MG/1
1 TABLET ORAL
Qty: 0 | Refills: 0 | COMMUNITY
Start: 2017-10-23

## 2017-10-23 RX ORDER — PANTOPRAZOLE SODIUM 20 MG/1
1 TABLET, DELAYED RELEASE ORAL
Qty: 14 | Refills: 0 | OUTPATIENT
Start: 2017-10-23 | End: 2017-11-06

## 2017-10-23 RX ORDER — ACETAMINOPHEN 500 MG
2 TABLET ORAL
Qty: 0 | Refills: 0 | COMMUNITY
Start: 2017-10-23

## 2017-10-23 RX ORDER — OXYCODONE HYDROCHLORIDE 5 MG/1
1 TABLET ORAL
Qty: 60 | Refills: 0 | OUTPATIENT
Start: 2017-10-23

## 2017-10-23 RX ORDER — DOCUSATE SODIUM 100 MG
1 CAPSULE ORAL
Qty: 0 | Refills: 0 | COMMUNITY
Start: 2017-10-23

## 2017-10-23 RX ORDER — TRAMADOL HYDROCHLORIDE 50 MG/1
1 TABLET ORAL
Qty: 21 | Refills: 0 | OUTPATIENT
Start: 2017-10-23 | End: 2017-10-30

## 2017-10-23 RX ORDER — PANTOPRAZOLE SODIUM 20 MG/1
1 TABLET, DELAYED RELEASE ORAL
Qty: 0 | Refills: 0 | COMMUNITY
Start: 2017-10-23

## 2017-10-23 RX ORDER — POLYETHYLENE GLYCOL 3350 17 G/17G
1 POWDER, FOR SOLUTION ORAL
Qty: 0 | Refills: 0 | COMMUNITY

## 2017-10-23 RX ORDER — TRAMADOL HYDROCHLORIDE 50 MG/1
1 TABLET ORAL
Qty: 0 | Refills: 0 | COMMUNITY
Start: 2017-10-23

## 2017-10-23 RX ORDER — ASPIRIN/CALCIUM CARB/MAGNESIUM 324 MG
1 TABLET ORAL
Qty: 80 | Refills: 0 | OUTPATIENT
Start: 2017-10-23 | End: 2017-12-02

## 2017-10-23 RX ORDER — ASPIRIN/CALCIUM CARB/MAGNESIUM 324 MG
1 TABLET ORAL
Qty: 0 | Refills: 0 | COMMUNITY
Start: 2017-10-23

## 2017-10-23 RX ORDER — MELOXICAM 15 MG/1
1 TABLET ORAL
Qty: 0 | Refills: 0 | COMMUNITY

## 2017-10-23 RX ADMIN — Medication 5 MILLIGRAM(S): at 05:35

## 2017-10-23 RX ADMIN — Medication 100 MILLIGRAM(S): at 05:35

## 2017-10-23 RX ADMIN — TRAMADOL HYDROCHLORIDE 50 MILLIGRAM(S): 50 TABLET ORAL at 08:36

## 2017-10-23 RX ADMIN — CELECOXIB 200 MILLIGRAM(S): 200 CAPSULE ORAL at 08:37

## 2017-10-23 RX ADMIN — TRAMADOL HYDROCHLORIDE 50 MILLIGRAM(S): 50 TABLET ORAL at 01:11

## 2017-10-23 RX ADMIN — Medication 325 MILLIGRAM(S): at 05:35

## 2017-10-23 RX ADMIN — Medication 2: at 12:13

## 2017-10-23 RX ADMIN — TRAMADOL HYDROCHLORIDE 50 MILLIGRAM(S): 50 TABLET ORAL at 00:41

## 2017-10-23 RX ADMIN — Medication 1 TABLET(S): at 05:35

## 2017-10-23 RX ADMIN — Medication 325 MILLIGRAM(S): at 12:09

## 2017-10-23 RX ADMIN — OXYCODONE HYDROCHLORIDE 5 MILLIGRAM(S): 5 TABLET ORAL at 05:36

## 2017-10-23 RX ADMIN — TRAMADOL HYDROCHLORIDE 50 MILLIGRAM(S): 50 TABLET ORAL at 09:05

## 2017-10-23 RX ADMIN — CELECOXIB 200 MILLIGRAM(S): 200 CAPSULE ORAL at 09:05

## 2017-10-23 RX ADMIN — PANTOPRAZOLE SODIUM 40 MILLIGRAM(S): 20 TABLET, DELAYED RELEASE ORAL at 12:09

## 2017-10-23 NOTE — DISCHARGE NOTE ADULT - HOSPITAL COURSE
History of Present Illness	  63 yo male with  PMH of HTN, HLD , DM Type 2 ( last HgA1c - 7.0 ), OA ( h/o Rt Hip replacement ). Pt c/o left hip pain ,worsening over the past few months, Currently his pain is 7/10 the scale of 0-10. Presents to [facility] for scheduled Left Anterior total Hip Replacement on 10/21/2017.    Allergies/Medications:   Allergies:        Allergies:  	No Known Allergies:     Home Medications:   * Patient Currently Takes Medications as of 03-Oct-2017 09:20 documented in Structured Notes  · 	enalapril 5 mg oral tablet: Last Dose Taken:  , 1 tab(s) orally 2 times a day  · 	metFORMIN 1000 mg oral tablet: Last Dose Taken:  , 1 tab(s) orally 2 times a day  · 	simvastatin 10 mg oral tablet: Last Dose Taken:  , 1 tab(s) orally once a day    PMH/PSH/FH/SH:    Past Medical History:  HLD (hyperlipidemia)    Hypertension    Type 2 diabetes mellitus    Unilateral primary osteoarthritis, left hip.     Past Surgical History:  History of hip replacement  Right- 2009  S/P cholecystectomy  laparoscopic - 2016  S/P inguinal hernia repair  2008. History of Present Illness	  61 yo male with  PMH of HTN, HLD , DM Type 2 ( last HgA1c - 7.0 ), OA ( h/o Rt Hip replacement ). Pt c/o left hip pain ,worsening over the past few months, Currently his pain is 7/10 the scale of 0-10. Presents to [facility] for scheduled Left Anterior total Hip Replacement on 10/21/2017.    Allergies/Medications:   Allergies:        Allergies:  	No Known Allergies:     Home Medications:   * Patient Currently Takes Medications as of 03-Oct-2017 09:20 documented in Structured Notes  · 	enalapril 5 mg oral tablet: Last Dose Taken:  , 1 tab(s) orally 2 times a day  · 	metFORMIN 1000 mg oral tablet: Last Dose Taken:  , 1 tab(s) orally 2 times a day  · 	simvastatin 10 mg oral tablet: Last Dose Taken:  , 1 tab(s) orally once a day    PMH/PSH/FH/SH:    Past Medical History:  HLD (hyperlipidemia)    Hypertension    Type 2 diabetes mellitus    Unilateral primary osteoarthritis, left hip.     Past Surgical History:  History of hip replacement  Right- 2009  S/P cholecystectomy  laparoscopic - 2016  S/P inguinal hernia repair  2008.  10/21/17- Patient presents to hospital for elective surgery, under went a left total hip replacement- tolerated procedure without complications. Patient was evaluated by Physical and Occupational therapy whom recommended home for discharge plan.             Patient stable for discharge when cleared by PT

## 2017-10-23 NOTE — PROGRESS NOTE ADULT - ASSESSMENT
Patient is a 62y old  Male with s/p  left total hip replacement".    Lt THR:  WBAT  Pain control - Adequate.  DVT prophylaxis  BID  Bowel regimen.  Ortho f/up noted.    HTN:  Enalapril    DM II:  FSSS    HLD:  Zocor    DC planning.

## 2017-10-23 NOTE — PROGRESS NOTE ADULT - SUBJECTIVE AND OBJECTIVE BOX
Pt seen and examined at bedside, denies SOB, CP, Dizziness. N/V/D /HA.  No significant overnight events. Pain well controlled. Patient ambulated  with PT yesterday, able to do stairs but with some difficulty.    Vital Signs Last 24 Hrs  T(C): 37.7 (23 Oct 2017 05:24), Max: 37.7 (23 Oct 2017 05:24)  T(F): 99.8 (23 Oct 2017 05:24), Max: 99.8 (23 Oct 2017 05:24)  HR: 93 (23 Oct 2017 05:24) (79 - 95)  BP: 135/80 (23 Oct 2017 05:24) (122/73 - 136/81)  BP(mean): --  RR: 18 (23 Oct 2017 05:24) (18 - 18)  SpO2: 95% (23 Oct 2017 05:24) (95% - 96%)    Gen: No apparent distress  RLE: aquacell c/d/i, thigh soft/compressible.    motor intact EHL/FHL/TA/GS .  Sensation is grossly intact to light touch.  Compartments are soft bilaterally.  DP 2+ BLE     Labs:  CBC Full  -  ( 22 Oct 2017 08:40 )  WBC Count : 8.50 K/uL  Hemoglobin : 11.1 g/dL  Hematocrit : 32.3 %  Platelet Count - Automated : 202 K/uL  Mean Cell Volume : 85.4 fl  Mean Cell Hemoglobin : 29.4 pg  Mean Cell Hemoglobin Concentration : 34.4 gm/dL  Auto Neutrophil # : x  Auto Lymphocyte # : x  Auto Monocyte # : x  Auto Eosinophil # : x  Auto Basophil # : x  Auto Neutrophil % : x  Auto Lymphocyte % : x  Auto Monocyte % : x  Auto Eosinophil % : x  Auto Basophil % : x         A/P  Pt is a 62M s/p L GAGANDEEP    - Pain control/ Analgesia  - DVT ppx, , foot pumps  -PT/OT - wbat/oob    - Incentive Spirometer  - Dispo for home likely this afternoon  - notify Ortho for questions

## 2017-10-23 NOTE — DISCHARGE NOTE ADULT - CARE PLAN
Principal Discharge DX:	Unilateral primary osteoarthritis, left hip  Goal:	Pain relief, improvement with activities of daily living  Instructions for follow-up, activity and diet:	Please call Dr. Pulido' s office within next few days to schedule a follow up appointment about 14 days after surgery. Dressing will be changed during office visit. Out of bed, ambulate, weight bearing as tolerated- Physical therapy to assist with exercise and help increase endurance

## 2017-10-23 NOTE — DISCHARGE NOTE ADULT - MEDICATION SUMMARY - MEDICATIONS TO TAKE
I will START or STAY ON the medications listed below when I get home from the hospital:    acetaminophen 325 mg oral tablet  -- 2 tab(s) by mouth every 8 hours, As needed, For Temp over 38.3 C (100.94 F)  -- Indication: For temps or head aches    aspirin 325 mg oral delayed release tablet  -- 1 tab(s) by mouth 2 times a day  -- Indication: For Antiplatelet therapy, DVT Prophalaxis    Mobic 15 mg oral tablet  -- 1 tab(s) by mouth once a day  -- Indication: For Antiinflammatory agent    oxyCODONE 5 mg oral tablet  -- 1 or 2 tab(s) by mouth every 3 hours, As Needed for pain MDD:10  -- Indication: For Pain medication    traMADol 50 mg oral tablet  -- 1 tab(s) by mouth every 8 hours MDD:3  -- Indication: For Pain medication    aspirin 325 mg oral delayed release tablet  -- 1 tab(s) by mouth 2 times a day, continue for 6 weeks total  -- Indication: For Antiplatelet therapy, dvt prophalaxis    enalapril 5 mg oral tablet  -- 1 tab(s) by mouth 2 times a day  -- Indication: For Antihypertensive agent    metFORMIN 1000 mg oral tablet  -- 1 tab(s) by mouth 2 times a day  -- Indication: For Antidiabetic agent    simvastatin 10 mg oral tablet  -- 1 tab(s) by mouth once a day  -- Indication: For Antihyperlipidemia agent    docusate sodium 100 mg oral capsule  -- 1 cap(s) by mouth 3 times a day  -- Indication: For Stool softener    MiraLax oral powder for reconstitution  -- 1 dose(s) by mouth once a day, As Needed  -- Indication: For Laxative    pantoprazole 40 mg oral delayed release tablet  -- 1 tab(s) by mouth once a day  -- Indication: For Antidyspepsia agent

## 2017-10-23 NOTE — DISCHARGE NOTE ADULT - CARE PROVIDER_API CALL
Farooq Pulido), Orthopaedic Surgery  [address] [address]  Phone: [phone]  Fax: [phone]

## 2017-10-23 NOTE — PROGRESS NOTE ADULT - SUBJECTIVE AND OBJECTIVE BOX
Patient is a 62y old  Male who presents with a chief complaint of Left hip arthritic pain/ Left total hip replacement (23 Oct 2017 07:28)      SUBJECTIVE / OVERNIGHT EVENTS:   Feels better.  Denies CP/SOB/Palpitation/HA.    MEDICATIONS  (STANDING):  aspirin enteric coated 325 milliGRAM(s) Oral two times a day  calcium carbonate 1250 mG + Vitamin D (OsCal 500 + D) 1 Tablet(s) Oral two times a day  ceFAZolin   IVPB 2000 milliGRAM(s) IV Intermittent once  celecoxib 200 milliGRAM(s) Oral with breakfast  dextrose 5%. 1000 milliLiter(s) (50 mL/Hr) IV Continuous <Continuous>  dextrose 50% Injectable 12.5 Gram(s) IV Push once  dextrose 50% Injectable 25 Gram(s) IV Push once  dextrose 50% Injectable 25 Gram(s) IV Push once  docusate sodium 100 milliGRAM(s) Oral three times a day  enalapril 5 milliGRAM(s) Oral two times a day  ferrous    sulfate 325 milliGRAM(s) Oral daily  influenza   Vaccine 0.5 milliLiter(s) IntraMuscular once  insulin lispro (HumaLOG) corrective regimen sliding scale   SubCutaneous three times a day before meals  insulin lispro (HumaLOG) corrective regimen sliding scale   SubCutaneous at bedtime  lactated ringers. 1000 milliLiter(s) (150 mL/Hr) IV Continuous <Continuous>  pantoprazole    Tablet 40 milliGRAM(s) Oral daily  simvastatin 10 milliGRAM(s) Oral at bedtime  traMADol 50 milliGRAM(s) Oral every 8 hours    MEDICATIONS  (PRN):  acetaminophen   Tablet 650 milliGRAM(s) Oral every 8 hours PRN For Temp over 38.3 C (100.94 F)  aluminum hydroxide/magnesium hydroxide/simethicone Suspension 30 milliLiter(s) Oral four times a day PRN Indigestion  dextrose Gel 1 Dose(s) Oral once PRN Blood Glucose LESS THAN 70 milliGRAM(s)/deciliter  glucagon  Injectable 1 milliGRAM(s) IntraMuscular once PRN Glucose LESS THAN 70 milligrams/deciliter  HYDROmorphone  Injectable 0.5 milliGRAM(s) IV Push every 4 hours PRN breakthrough pain  ondansetron Injectable 4 milliGRAM(s) IV Push every 6 hours PRN Nausea and/or Vomiting  oxyCODONE    IR 5 milliGRAM(s) Oral every 4 hours PRN Moderate Pain (4 - 6)  oxyCODONE    IR 10 milliGRAM(s) Oral every 4 hours PRN Severe Pain (7 - 10)  senna 2 Tablet(s) Oral at bedtime PRN Constipation        CAPILLARY BLOOD GLUCOSE      POCT Blood Glucose.: 208 mg/dL (23 Oct 2017 11:56)  POCT Blood Glucose.: 141 mg/dL (23 Oct 2017 08:01)  POCT Blood Glucose.: 167 mg/dL (22 Oct 2017 21:21)  POCT Blood Glucose.: 181 mg/dL (22 Oct 2017 17:30)    I&O's Summary    22 Oct 2017 07:01  -  23 Oct 2017 07:00  --------------------------------------------------------  IN: 2440 mL / OUT: 2920 mL / NET: -480 mL    23 Oct 2017 07:01  -  23 Oct 2017 14:11  --------------------------------------------------------  IN: 320 mL / OUT: 680 mL / NET: -360 mL        PHYSICAL EXAM:  GENERAL: NAD, well-developed  HEAD:  Atraumatic, Normocephalic  NECK: Supple, No JVD  CHEST/LUNG: Clear to auscultation bilaterally; No wheezing.  HEART: Regular rate and rhythm; No murmurs, rubs, or gallops  ABDOMEN: Soft, Nontender, Nondistended; Bowel sounds present  EXTREMITIES:   No clubbing, cyanosis, or edema  NEUROLOGY: AAO X 3  SKIN: No rashes    LABS:                        11.7   6.2   )-----------( 187      ( 23 Oct 2017 06:36 )             34.0     10-23    138  |  98  |  13  ----------------------------<  135<H>  4.4   |  30  |  0.78    Ca    8.4      23 Oct 2017 06:36              CAPILLARY BLOOD GLUCOSE      POCT Blood Glucose.: 208 mg/dL (23 Oct 2017 11:56)  POCT Blood Glucose.: 141 mg/dL (23 Oct 2017 08:01)  POCT Blood Glucose.: 167 mg/dL (22 Oct 2017 21:21)  POCT Blood Glucose.: 181 mg/dL (22 Oct 2017 17:30)                RADIOLOGY & ADDITIONAL TESTS:    Imaging Personally Reviewed:    Consultant(s) Notes Reviewed:      Care Discussed with Consultants/Other Providers:

## 2017-10-23 NOTE — DISCHARGE NOTE ADULT - NS AS ACTIVITY OBS
Patient may shower, limit direct water to dressing, if wet pat dry/Do not drive or operate machinery/Walking-Outdoors allowed/Stairs allowed/Walking-Indoors allowed/Do not make important decisions

## 2017-10-23 NOTE — DISCHARGE NOTE ADULT - PLAN OF CARE
Pain relief, improvement with activities of daily living Please call Dr. Pulido' s office within next few days to schedule a follow up appointment about 14 days after surgery. Dressing will be changed during office visit. Out of bed, ambulate, weight bearing as tolerated- Physical therapy to assist with exercise and help increase endurance

## 2017-10-23 NOTE — DISCHARGE NOTE ADULT - PATIENT PORTAL LINK FT
“You can access the [employer] Patient Portal, offered by [facility], by registering with the following website: [email or web address]”

## 2017-10-25 ENCOUNTER — TRANSCRIPTION ENCOUNTER (OUTPATIENT)
Age: 62
End: 2017-10-25

## 2017-10-26 LAB — SURGICAL PATHOLOGY STUDY: SIGNIFICANT CHANGE UP

## 2017-11-09 ENCOUNTER — APPOINTMENT (OUTPATIENT)
Dept: ORTHOPEDIC SURGERY | Facility: CLINIC | Age: 62
End: 2017-11-09
Payer: COMMERCIAL

## 2017-11-09 VITALS — DIASTOLIC BLOOD PRESSURE: 72 MMHG | SYSTOLIC BLOOD PRESSURE: 123 MMHG | HEART RATE: 89 BPM

## 2017-11-09 PROCEDURE — 73502 X-RAY EXAM HIP UNI 2-3 VIEWS: CPT | Mod: LT

## 2017-11-09 PROCEDURE — 99024 POSTOP FOLLOW-UP VISIT: CPT

## 2017-11-20 ENCOUNTER — RX RENEWAL (OUTPATIENT)
Age: 62
End: 2017-11-20

## 2017-12-07 ENCOUNTER — APPOINTMENT (OUTPATIENT)
Dept: ENDOCRINOLOGY | Facility: CLINIC | Age: 62
End: 2017-12-07
Payer: COMMERCIAL

## 2017-12-07 ENCOUNTER — RX RENEWAL (OUTPATIENT)
Age: 62
End: 2017-12-07

## 2017-12-07 VITALS
SYSTOLIC BLOOD PRESSURE: 122 MMHG | OXYGEN SATURATION: 97 % | WEIGHT: 204 LBS | HEIGHT: 71 IN | BODY MASS INDEX: 28.56 KG/M2 | DIASTOLIC BLOOD PRESSURE: 82 MMHG | HEART RATE: 85 BPM

## 2017-12-07 LAB
GLUCOSE BLDC GLUCOMTR-MCNC: 124
HBA1C MFR BLD HPLC: 6.3

## 2017-12-07 PROCEDURE — 83036 HEMOGLOBIN GLYCOSYLATED A1C: CPT | Mod: QW

## 2017-12-07 PROCEDURE — 82962 GLUCOSE BLOOD TEST: CPT

## 2017-12-07 PROCEDURE — 99214 OFFICE O/P EST MOD 30 MIN: CPT | Mod: 25

## 2018-01-18 ENCOUNTER — APPOINTMENT (OUTPATIENT)
Dept: ORTHOPEDIC SURGERY | Facility: CLINIC | Age: 63
End: 2018-01-18
Payer: COMMERCIAL

## 2018-01-18 VITALS — HEART RATE: 92 BPM | SYSTOLIC BLOOD PRESSURE: 144 MMHG | DIASTOLIC BLOOD PRESSURE: 89 MMHG | HEIGHT: 71 IN

## 2018-01-18 DIAGNOSIS — Z96.642 PRESENCE OF LEFT ARTIFICIAL HIP JOINT: ICD-10-CM

## 2018-01-18 DIAGNOSIS — M25.551 PAIN IN RIGHT HIP: ICD-10-CM

## 2018-01-18 DIAGNOSIS — Z96.641 PRESENCE OF RIGHT ARTIFICIAL HIP JOINT: ICD-10-CM

## 2018-01-18 PROCEDURE — 73521 X-RAY EXAM HIPS BI 2 VIEWS: CPT

## 2018-01-18 PROCEDURE — 99214 OFFICE O/P EST MOD 30 MIN: CPT | Mod: 24

## 2018-01-30 PROBLEM — M25.551 RIGHT HIP PAIN: Status: ACTIVE | Noted: 2018-01-30

## 2018-01-30 PROBLEM — Z96.641 HISTORY OF RIGHT HIP REPLACEMENT: Status: ACTIVE | Noted: 2017-09-07

## 2018-01-30 PROBLEM — Z96.642 STATUS POST TOTAL REPLACEMENT OF LEFT HIP: Status: ACTIVE | Noted: 2017-11-08

## 2018-02-05 ENCOUNTER — MEDICATION RENEWAL (OUTPATIENT)
Age: 63
End: 2018-02-05

## 2018-04-19 ENCOUNTER — APPOINTMENT (OUTPATIENT)
Dept: ORTHOPEDIC SURGERY | Facility: CLINIC | Age: 63
End: 2018-04-19

## 2018-05-09 ENCOUNTER — MEDICATION RENEWAL (OUTPATIENT)
Age: 63
End: 2018-05-09

## 2018-06-14 ENCOUNTER — APPOINTMENT (OUTPATIENT)
Dept: ENDOCRINOLOGY | Facility: CLINIC | Age: 63
End: 2018-06-14

## 2018-07-24 PROBLEM — M16.12 PRIMARY LOCALIZED OSTEOARTHROSIS OF PELVIC REGION, LEFT: Status: ACTIVE | Noted: 2017-09-07

## 2018-09-14 ENCOUNTER — RX RENEWAL (OUTPATIENT)
Age: 63
End: 2018-09-14

## 2018-11-30 ENCOUNTER — RX RENEWAL (OUTPATIENT)
Age: 63
End: 2018-11-30

## 2018-12-03 ENCOUNTER — RX RENEWAL (OUTPATIENT)
Age: 63
End: 2018-12-03

## 2019-03-18 ENCOUNTER — OFFICE VISIT (OUTPATIENT)
Dept: FAMILY MEDICINE CLINIC | Age: 64
End: 2019-03-18

## 2019-03-18 VITALS
OXYGEN SATURATION: 97 % | HEIGHT: 71 IN | BODY MASS INDEX: 29.96 KG/M2 | TEMPERATURE: 96 F | WEIGHT: 214 LBS | DIASTOLIC BLOOD PRESSURE: 78 MMHG | SYSTOLIC BLOOD PRESSURE: 149 MMHG | RESPIRATION RATE: 18 BRPM | HEART RATE: 75 BPM

## 2019-03-18 DIAGNOSIS — H61.22 IMPACTED CERUMEN OF LEFT EAR: ICD-10-CM

## 2019-03-18 DIAGNOSIS — R42 DIZZINESS: ICD-10-CM

## 2019-03-18 DIAGNOSIS — Z12.11 SCREEN FOR COLON CANCER: ICD-10-CM

## 2019-03-18 DIAGNOSIS — E78.5 DYSLIPIDEMIA: ICD-10-CM

## 2019-03-18 DIAGNOSIS — I10 ESSENTIAL HYPERTENSION: ICD-10-CM

## 2019-03-18 DIAGNOSIS — R61 NIGHT SWEATS: ICD-10-CM

## 2019-03-18 DIAGNOSIS — E11.65 TYPE 2 DIABETES MELLITUS WITH HYPERGLYCEMIA, WITHOUT LONG-TERM CURRENT USE OF INSULIN (HCC): Primary | ICD-10-CM

## 2019-03-18 DIAGNOSIS — E07.9 THYROID DISORDER: ICD-10-CM

## 2019-03-18 PROBLEM — M16.12 UNILATERAL PRIMARY OSTEOARTHRITIS, LEFT HIP: Chronic | Status: ACTIVE | Noted: 2017-10-03

## 2019-03-18 RX ORDER — SIMVASTATIN 10 MG/1
10 TABLET, FILM COATED ORAL
Qty: 30 TAB | Refills: 2 | Status: SHIPPED | OUTPATIENT
Start: 2019-03-18 | End: 2019-03-18 | Stop reason: SDUPTHER

## 2019-03-18 RX ORDER — ENALAPRIL MALEATE 5 MG/1
5 TABLET ORAL DAILY
Qty: 30 TAB | Refills: 2 | Status: SHIPPED | OUTPATIENT
Start: 2019-03-18 | End: 2019-03-18 | Stop reason: SDUPTHER

## 2019-03-18 RX ORDER — SIMVASTATIN 10 MG/1
TABLET, FILM COATED ORAL
COMMUNITY
End: 2019-03-18 | Stop reason: SDUPTHER

## 2019-03-18 RX ORDER — ENALAPRIL MALEATE 5 MG/1
TABLET ORAL DAILY
COMMUNITY
End: 2019-03-18 | Stop reason: SDUPTHER

## 2019-03-18 RX ORDER — METFORMIN HYDROCHLORIDE 1000 MG/1
1000 TABLET ORAL 2 TIMES DAILY WITH MEALS
COMMUNITY
End: 2020-08-20 | Stop reason: SDUPTHER

## 2019-03-18 NOTE — PROGRESS NOTES
Chief Complaint   Patient presents with    Establish Care    Complete Physical     Borderline diabetes     Dizziness    Medication Refill     all medication      1. Have you been to the ER, urgent care clinic since your last visit? Hospitalized since your last visit? No    2. Have you seen or consulted any other health care providers outside of the [facility] since your last visit? Include any pap smears or colon screening. No     [facility] comes in to establish care. Diabetes mellitus 2: Patient has type 2 diabetes mellitus. He is on metformin. Does not have his blood glucose log but states that his numbers have been stable. We will check his labs today. I will do an HbA1c. He would like to come in and get this done tomorrow morning when he is fasting. I will follow-up with him once I get the results. I will do a foot exam at next visit. Did advise that he keep a blood glucose log and follow-up with me at next visit. Patient is seen by an endocrinologist in [place]. He states that he will also get a follow-up appointment with his endocrinologist as he is due to visit [place] soon. HTN: Patient has hypertension. His blood pressure today is elevated at 149/78. He has been out of the enalapril. Would like a refill of this medication. He denies headache, focal weakness or changes in vision. Dizziness: Patient has dizziness that comes on and off. It comes mainly with changing in position from laying down to standing position or twisting the head. This is BPPV. I discussed vertigo with him. For now it seems to be improving. We will hold off on any medication. He denies nausea, vomiting, falls or syncope. No palpitations, chest pain or diaphoresis. Pluged ears: Patient has plugged sensation with muffled sounds left ear. He does have cerumen impaction in that ear. We will give him Debrox eardrops to use.   He will come in for ear irrigation at next visit.  Dyslipidemia: Patient has a history of dyslipidemia and is on simvastatin. We will recheck his lipid panel. We will continue with the current treatment plan. Overweight: Patient has a BMI of 29.85. This is overweight. Discussed normal BMI. He will do lifestyle and dietary modification in an effort to cut down on his weight. Night sweats: Patient has been having episodes of night sweats on and off. States that his sweating is excessive. Denies weight loss, fever or chills. I will check his thyroid levels. I will check his other labs. We will follow-up at next visit. He denies cough, wheeze or shortness of breath. No hemoptysis. Patient does need to have colon cancer screening. I will place a referral for this. Past Medical History  Past Medical History:   Diagnosis Date    Hypertension        Surgical History  Past Surgical History:   Procedure Laterality Date    HX HIP REPLACEMENT  2017    BOTH RIGHT AND LEFT         Medications  Current Outpatient Medications   Medication Sig Dispense Refill    metFORMIN (GLUCOPHAGE) 1,000 mg tablet Take 1,000 mg by mouth two (2) times daily (with meals).  simvastatin (ZOCOR) 10 mg tablet Take  by mouth nightly.  enalapril (VASOTEC) 5 mg tablet Take  by mouth daily.          Allergies  No Known Allergies    Family History  Family History   Problem Relation Age of Onset    Diabetes Mother     Hypertension Mother     Cancer Sister     Cancer Sister        Social History  Social History     Socioeconomic History    Marital status: UNKNOWN     Spouse name: Not on file    Number of children: Not on file    Years of education: Not on file    Highest education level: Not on file   Social Needs    Financial resource strain: Not on file    Food insecurity - worry: Not on file    Food insecurity - inability: Not on file   [place] needs - medical: Not on file   [place] needs - non-medical: Not on file   Occupational History  Not on file   Tobacco Use    Smoking status: Never Smoker    Smokeless tobacco: Never Used   Substance and Sexual Activity    Alcohol use: Yes    Drug use: No    Sexual activity: No   Other Topics Concern    Not on file   Social History Narrative    Not on file       Review of Systems  Review of Systems -review of all systems is negative except as noted above in the HPI. Vital Signs  Visit Vitals  /78 (BP 1 Location: Left arm, BP Patient Position: Sitting)   Pulse 75   Temp 96 °F (35.6 °C) (Oral)   Resp 18   Ht 5' 11\" (1.803 m)   Wt 214 lb (97.1 kg)   SpO2 97%   BMI 29.85 kg/m²         Physical Exam  Physical Examination: General appearance - alert, well appearing, and in no distress, oriented to person, place, and time, overweight, acyanotic, in no respiratory distress and well hydrated  Mental status - alert, oriented to person, place, and time, affect appropriate to mood  Eyes - pupils equal and reactive, extraocular eye movements intact, sclera anicteric  Ears - ceruminosis noted  Nose - normal and patent, no erythema, discharge or polyps  Mouth - mucous membranes moist, pharynx normal without lesions  Neck - supple, no significant adenopathy  Lymphatics - no palpable lymphadenopathy, no hepatosplenomegaly  Chest - clear to auscultation, no wheezes, rales or rhonchi, symmetric air entry  Heart - normal rate and regular rhythm, S1 and S2 normal  Abdomen - no rebound tenderness noted  Musculoskeletal - osteoarthritic changes noted in both hands  Extremities - no pedal edema noted, intact peripheral pulses  Back exam - limited range of motion, pain with motion noted during exam  Neurological - neck supple without rigidity          Results  No results found for this or any previous visit. ASSESSMENT and PLAN    ICD-10-CM ICD-9-CM    1.  Type 2 diabetes mellitus with hyperglycemia, without long-term current use of insulin (HCC) E11.65 250.00 CBC WITH AUTOMATED DIFF     497.29 METABOLIC PANEL, COMPREHENSIVE      LIPID PANEL      HEMOGLOBIN A1C WITH EAG      MICROALBUMIN, UR, RAND W/ MICROALB/CREAT RATIO   2. Dizziness R42 780.4    3. Essential hypertension I10 401.9 DISCONTINUED: enalapril (VASOTEC) 5 mg tablet   4. Screen for colon cancer Z12.11 V76.51 REFERRAL TO COLON AND RECTAL SURGERY   5. Thyroid disorder E07.9 246.9 TSH 3RD GENERATION   6. Night sweats R61 780.8    7. Dyslipidemia E78.5 272.4 DISCONTINUED: simvastatin (ZOCOR) 10 mg tablet   8. Impacted cerumen of left ear H61.22 380.4 carbamide peroxide (DEBROX) 6.5 % otic solution   Discussed the patient's BMI with him. The BMI follow up plan is as follows:   dietary management education, guidance, and counseling  encourage exercise  monitor weight  lab results and schedule of future lab studies reviewed with patient  reviewed diet, exercise and weight control  very strongly urged to quit smoking to reduce cardiovascular risk  reviewed medications and side effects in detail  specific diabetic recommendations: low cholesterol diet, weight control and daily exercise discussed, home glucose monitoring emphasized, all medications, side effects and compliance discussed carefully, foot care discussed and Podiatry visits discussed, annual eye examinations at Ophthalmology discussed, glycohemoglobin and other lab monitoring discussed and long term diabetic complications discussed      I have discussed the diagnosis with the patient and the intended plan of care as seen in the above orders. The patient has received an after-visit summary and questions were answered concerning future plans. I have discussed medication, side effects, and warnings with the patient in detail. The patient verbalized understanding and is in agreement with the plan of care. The patient will contact the office with any additional concerns.     Nic Reyes MD

## 2019-03-18 NOTE — PATIENT INSTRUCTIONS
Body Mass Index: Care Instructions  Your Care Instructions    Body mass index (BMI) can help you see if your weight is raising your risk for health problems. It uses a formula to compare how much you weigh with how tall you are. · A BMI lower than 18.5 is considered underweight. · A BMI between 18.5 and 24.9 is considered healthy. · A BMI between 25 and 29.9 is considered overweight. A BMI of 30 or higher is considered obese. If your BMI is in the normal range, it means that you have a lower risk for weight-related health problems. If your BMI is in the overweight or obese range, you may be at increased risk for weight-related health problems, such as high blood pressure, heart disease, stroke, arthritis or joint pain, and diabetes. If your BMI is in the underweight range, you may be at increased risk for health problems such as fatigue, lower protection (immunity) against illness, muscle loss, bone loss, hair loss, and hormone problems. BMI is just one measure of your risk for weight-related health problems. You may be at higher risk for health problems if you are not active, you eat an unhealthy diet, or you drink too much alcohol or use tobacco products. Follow-up care is a key part of your treatment and safety. Be sure to make and go to all appointments, and call your doctor if you are having problems. It's also a good idea to know your test results and keep a list of the medicines you take. How can you care for yourself at home? · Practice healthy eating habits. This includes eating plenty of fruits, vegetables, whole grains, lean protein, and low-fat dairy. · If your doctor recommends it, get more exercise. Walking is a good choice. Bit by bit, increase the amount you walk every day. Try for at least 30 minutes on most days of the week. · Do not smoke. Smoking can increase your risk for health problems. If you need help quitting, talk to your doctor about stop-smoking programs and medicines. These can increase your chances of quitting for good. · Limit alcohol to 2 drinks a day for men and 1 drink a day for women. Too much alcohol can cause health problems. If you have a BMI higher than 25  · Your doctor may do other tests to check your risk for weight-related health problems. This may include measuring the distance around your waist. A waist measurement of more than 40 inches in men or 35 inches in women can increase the risk of weight-related health problems. · Talk with your doctor about steps you can take to stay healthy or improve your health. You may need to make lifestyle changes to lose weight and stay healthy, such as changing your diet and getting regular exercise. If you have a BMI lower than 18.5  · Your doctor may do other tests to check your risk for health problems. · Talk with your doctor about steps you can take to stay healthy or improve your health. You may need to make lifestyle changes to gain or maintain weight and stay healthy, such as getting more healthy foods in your diet and doing exercises to build muscle. Where can you learn more? Go to http://sheree-russ.info/. Enter S176 in the search box to learn more about \"Body Mass Index: Care Instructions. \"  Current as of: October 13, 2016  Content Version: 11.4  © 4948-8885 [employer]. Care instructions adapted under license by [employer] (which disclaims liability or warranty for this information). If you have questions about a medical condition or this instruction, always ask your healthcare professional. [address] any warranty or liability for your use of this information.

## 2019-03-19 ENCOUNTER — HOSPITAL ENCOUNTER (OUTPATIENT)
Dept: LAB | Age: 64
Discharge: HOME | End: 2019-03-19
Payer: COMMERCIAL

## 2019-03-19 ENCOUNTER — LAB ONLY (OUTPATIENT)
Dept: FAMILY MEDICINE CLINIC | Age: 64
End: 2019-03-19

## 2019-03-19 DIAGNOSIS — E07.9 THYROID DISORDER: ICD-10-CM

## 2019-03-19 DIAGNOSIS — E11.65 TYPE 2 DIABETES MELLITUS WITH HYPERGLYCEMIA, WITHOUT LONG-TERM CURRENT USE OF INSULIN (HCC): ICD-10-CM

## 2019-03-19 DIAGNOSIS — Z01.89 ENCOUNTER FOR LABORATORY TEST: Primary | ICD-10-CM

## 2019-03-19 LAB
ALBUMIN SERPL-MCNC: 3.9 G/DL (ref 3.4–5)
ALBUMIN/GLOB SERPL: 1 {RATIO} (ref 0.8–1.7)
ALP SERPL-CCNC: 92 U/L (ref 45–117)
ALT SERPL-CCNC: 24 U/L (ref 16–61)
ANION GAP SERPL CALC-SCNC: 8 MMOL/L (ref 3–18)
AST SERPL-CCNC: 13 U/L (ref 15–37)
BASOPHILS # BLD: 0 K/UL (ref 0–0.1)
BASOPHILS NFR BLD: 1 % (ref 0–2)
BILIRUB SERPL-MCNC: 0.7 MG/DL (ref 0.2–1)
BUN SERPL-MCNC: 12 MG/DL (ref 7–18)
BUN/CREAT SERPL: 15 (ref 12–20)
CALCIUM SERPL-MCNC: 8.5 MG/DL (ref 8.5–10.1)
CHLORIDE SERPL-SCNC: 103 MMOL/L (ref 100–108)
CHOLEST SERPL-MCNC: 113 MG/DL
CO2 SERPL-SCNC: 26 MMOL/L (ref 21–32)
CREAT SERPL-MCNC: 0.78 MG/DL (ref 0.6–1.3)
DIFFERENTIAL METHOD BLD: ABNORMAL
EOSINOPHIL # BLD: 0.1 K/UL (ref 0–0.4)
EOSINOPHIL NFR BLD: 3 % (ref 0–5)
ERYTHROCYTE [DISTWIDTH] IN BLOOD BY AUTOMATED COUNT: 12.6 % (ref 11.6–14.5)
EST. AVERAGE GLUCOSE BLD GHB EST-MCNC: 212 MG/DL
GLOBULIN SER CALC-MCNC: 3.9 G/DL (ref 2–4)
GLUCOSE SERPL-MCNC: 172 MG/DL (ref 74–99)
HBA1C MFR BLD: 9 % (ref 4.2–5.6)
HCT VFR BLD AUTO: 43.3 % (ref 36–48)
HDLC SERPL-MCNC: 36 MG/DL (ref 40–60)
HDLC SERPL: 3.1 {RATIO} (ref 0–5)
HGB BLD-MCNC: 14 G/DL (ref 13–16)
LDLC SERPL CALC-MCNC: 64 MG/DL (ref 0–100)
LIPID PROFILE,FLP: ABNORMAL
LYMPHOCYTES # BLD: 1.2 K/UL (ref 0.9–3.6)
LYMPHOCYTES NFR BLD: 28 % (ref 21–52)
MCH RBC QN AUTO: 28.4 PG (ref 24–34)
MCHC RBC AUTO-ENTMCNC: 32.3 G/DL (ref 31–37)
MCV RBC AUTO: 87.8 FL (ref 74–97)
MONOCYTES # BLD: 0.3 K/UL (ref 0.05–1.2)
MONOCYTES NFR BLD: 8 % (ref 3–10)
NEUTS SEG # BLD: 2.5 K/UL (ref 1.8–8)
NEUTS SEG NFR BLD: 60 % (ref 40–73)
PLATELET # BLD AUTO: 259 K/UL (ref 135–420)
PMV BLD AUTO: 9.5 FL (ref 9.2–11.8)
POTASSIUM SERPL-SCNC: 4.3 MMOL/L (ref 3.5–5.5)
PROT SERPL-MCNC: 7.8 G/DL (ref 6.4–8.2)
RBC # BLD AUTO: 4.93 M/UL (ref 4.7–5.5)
SODIUM SERPL-SCNC: 137 MMOL/L (ref 136–145)
TRIGL SERPL-MCNC: 65 MG/DL (ref ?–150)
TSH SERPL DL<=0.05 MIU/L-ACNC: 2.3 UIU/ML (ref 0.36–3.74)
VLDLC SERPL CALC-MCNC: 13 MG/DL
WBC # BLD AUTO: 4.2 K/UL (ref 4.6–13.2)

## 2019-03-19 PROCEDURE — 84443 ASSAY THYROID STIM HORMONE: CPT

## 2019-03-19 PROCEDURE — 83036 HEMOGLOBIN GLYCOSYLATED A1C: CPT

## 2019-03-19 PROCEDURE — 85025 COMPLETE CBC W/AUTO DIFF WBC: CPT

## 2019-03-19 PROCEDURE — 80061 LIPID PANEL: CPT

## 2019-03-19 PROCEDURE — 82043 UR ALBUMIN QUANTITATIVE: CPT

## 2019-03-19 PROCEDURE — 80053 COMPREHEN METABOLIC PANEL: CPT

## 2019-03-19 RX ORDER — ENALAPRIL MALEATE 5 MG/1
TABLET ORAL
Qty: 90 TAB | Refills: 2 | Status: SHIPPED | OUTPATIENT
Start: 2019-03-19 | End: 2021-01-26

## 2019-03-19 RX ORDER — SIMVASTATIN 10 MG/1
TABLET, FILM COATED ORAL
Qty: 90 TAB | Refills: 2 | Status: SHIPPED | OUTPATIENT
Start: 2019-03-19 | End: 2020-09-11

## 2019-03-19 NOTE — PROGRESS NOTES
Patient here for lab visit at this time. Labs ordered by Kodi Saez at this time. Venipuncture to left forearm at this time. Patient tolerated well.

## 2019-03-20 LAB
CREAT UR-MCNC: 126 MG/DL (ref 30–125)
MICROALBUMIN UR-MCNC: 64.6 MG/DL (ref 0–3)
MICROALBUMIN/CREAT UR-RTO: 513 MG/G (ref 0–30)

## 2019-03-22 ENCOUNTER — TELEPHONE (OUTPATIENT)
Dept: FAMILY MEDICINE CLINIC | Age: 64
End: 2019-03-22

## 2019-03-22 NOTE — PROGRESS NOTES
Please let patient know his HbA1c is 9.0. This indicates his blood glucose is elevated and his diabetes is uncontrolled. 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 This is due to diabetes. He is on the enalapril and should continue with this medication. I will add one medication called Jardiance to take daily to help with a blood glucose control. He should keep a blood glucose log and follow-up with me at next visit. Rest of his labs are reassuring.   Henrietta Newton MD

## 2019-03-22 NOTE — TELEPHONE ENCOUNTER
Received call from pt 2 identifiers used. Pt made aware of lab results and plan of care. New medication Jardiance 10 mg be started daily. Pt verbalizes understanding.

## 2019-03-27 ENCOUNTER — OFFICE VISIT (OUTPATIENT)
Dept: FAMILY MEDICINE CLINIC | Age: 64
End: 2019-03-27

## 2019-04-02 ENCOUNTER — OFFICE VISIT (OUTPATIENT)
Dept: FAMILY MEDICINE CLINIC | Age: 64
End: 2019-04-02

## 2019-04-02 VITALS
RESPIRATION RATE: 16 BRPM | BODY MASS INDEX: 28.98 KG/M2 | HEIGHT: 71 IN | WEIGHT: 207 LBS | HEART RATE: 86 BPM | TEMPERATURE: 96.9 F | DIASTOLIC BLOOD PRESSURE: 78 MMHG | OXYGEN SATURATION: 93 % | SYSTOLIC BLOOD PRESSURE: 139 MMHG

## 2019-04-02 DIAGNOSIS — E11.65 TYPE 2 DIABETES MELLITUS WITH HYPERGLYCEMIA, WITHOUT LONG-TERM CURRENT USE OF INSULIN (HCC): Primary | ICD-10-CM

## 2019-04-02 DIAGNOSIS — I10 ESSENTIAL HYPERTENSION: ICD-10-CM

## 2019-04-02 DIAGNOSIS — H61.22 IMPACTED CERUMEN OF LEFT EAR: ICD-10-CM

## 2019-04-02 DIAGNOSIS — E11.9 COMPREHENSIVE DIABETIC FOOT EXAMINATION, TYPE 2 DM, ENCOUNTER FOR (HCC): ICD-10-CM

## 2019-04-02 DIAGNOSIS — E78.5 DYSLIPIDEMIA: ICD-10-CM

## 2019-04-02 NOTE — PROGRESS NOTES
Chief Complaint Patient presents with  Ear Pain  
  continued Left ear pain 1. Have you been to the ER, urgent care clinic since your last visit? Hospitalized since your last visit? No 
 
2. Have you seen or consulted any other health care providers outside of the [address] [place] [address] since your last visit? Include any pap smears or colon screening. No  
 
HPI Eudora Bamberger comes in for follow-up care. Patient had left ear pain and some slight diminished hearing in that side. Noted to have a cerumen impaction in that ear. Has been using Debrox eardrops. I did do ear irrigation and wax was removed. Felt some improvement in his hearing. Would prefer to hold off on seeing an ENT at the moment for hearing exam.  Patient has diabetes mellitus type 2. Has started the Jardiance. He is scheduled to see his endocrinologist.  Blood glucose numbers have come down. These are around 140 at the moment. He continues with lifestyle and dietary modification. He is also on metformin thousand milligrams twice daily. Patient has hypertension. He takes enalapril 5 mg daily. Blood pressure is stable. Denies headache, chest pain or shortness of breath. No changes in vision or focal weakness. Patient has dyslipidemia. Takes Zocor 10 mg at bedtime. Tolerating the medication. We will continue with this. I did do a diabetic foot exam and this is normal. 
 
 
Past Medical History Past Medical History:  
Diagnosis Date  Hypertension Surgical History Past Surgical History:  
Procedure Laterality Date [facility] HIP REPLACEMENT  2017 BOTH RIGHT AND LEFT Medications Current Outpatient Medications Medication Sig Dispense Refill  empagliflozin (JARDIANCE) 10 mg tablet Take 1 Tab by mouth daily.  30 Tab 2  
 simvastatin (ZOCOR) 10 mg tablet TAKE 1 TABLET BY MOUTH NIGHTLY 90 Tab 2  
 enalapril (VASOTEC) 5 mg tablet TAKE 1 TABLET ONCE A DAY 90 Tab 2  
  metFORMIN (GLUCOPHAGE) 1,000 mg tablet Take 1,000 mg by mouth two (2) times daily (with meals).  carbamide peroxide (DEBROX) 6.5 % otic solution Administer 5 Drops into each ear two (2) times a day. 7.5 mL 0 Allergies No Known Allergies Family History Family History Problem Relation Age of Onset  Diabetes Mother  Hypertension Mother  Cancer Sister  Cancer Sister Social History Social History Socioeconomic History  Marital status: UNKNOWN Spouse name: Not on file  Number of children: Not on file  Years of education: Not on file  Highest education level: Not on file Occupational History  Not on file Social Needs  Financial resource strain: Not on file  Food insecurity:  
  Worry: Not on file Inability: Not on file  Transportation needs:  
  Medical: Not on file Non-medical: Not on file Tobacco Use  Smoking status: Never Smoker  Smokeless tobacco: Never Used Substance and Sexual Activity  Alcohol use: Yes  Drug use: No  
 Sexual activity: Never Lifestyle  Physical activity:  
  Days per week: Not on file Minutes per session: Not on file  Stress: Not on file Relationships  Social connections:  
  Talks on phone: Not on file Gets together: Not on file Attends Restorationism service: Not on file Active member of club or organization: Not on file Attends meetings of clubs or organizations: Not on file Relationship status: Not on file  Intimate partner violence:  
  Fear of current or ex partner: Not on file Emotionally abused: Not on file Physically abused: Not on file Forced sexual activity: Not on file Other Topics Concern  Not on file Social History Narrative  Not on file Review of Systems Review of Systems -review of all systems is negative except as noted above in the HPI. Vital Signs Visit Vitals /78 (BP 1 Location: Left arm, BP Patient Position: Sitting) Pulse 86 Temp 96.9 °F (36.1 °C) (Oral) Resp 16 Ht 5' 11\" (1.803 m) Wt 207 lb (93.9 kg) SpO2 93% BMI 28.87 kg/m² Physical Exam 
Physical Examination: General appearance - alert, well appearing, and in no distress, oriented to person, place, and time and acyanotic, in no respiratory distress Mental status - alert, oriented to person, place, and time, normal mood, behavior, speech, dress, motor activity, and thought processes Eyes - sclera anicteric Ears - ceruminosis noted left ear,  irrigation done and the ceruminosis removed Nose - normal and patent, no erythema, discharge or polyps Mouth - mucous membranes moist, pharynx normal without lesions Neck - supple, no significant adenopathy Lymphatics - no palpable lymphadenopathy Chest - no tachypnea, retractions or cyanosis Heart - normal rate and regular rhythm, S1 and S2 normal 
Neurological - alert, oriented, normal speech, no focal findings or movement disorder noted Musculoskeletal - full range of motion without pain Diabetic foot exam:  
 
Left Foot: 
 Visual Exam: normal  
 Pulse DP: 2+ (normal) Filament test: normal sensation Right Foot: 
 Visual Exam: normal  
 Pulse DP: 2+ (normal) Filament test: normal sensation Results Results for orders placed or performed during the hospital encounter of 03/19/19 CBC WITH AUTOMATED DIFF Result Value Ref Range WBC 4.2 (L) 4.6 - 13.2 K/uL  
 RBC 4.93 4.70 - 5.50 M/uL  
 HGB 14.0 13.0 - 16.0 g/dL HCT 43.3 36.0 - 48.0 % MCV 87.8 74.0 - 97.0 FL  
 MCH 28.4 24.0 - 34.0 PG  
 MCHC 32.3 31.0 - 37.0 g/dL  
 RDW 12.6 11.6 - 14.5 % PLATELET 892 708 - 813 K/uL MPV 9.5 9.2 - 11.8 FL  
 NEUTROPHILS 60 40 - 73 % LYMPHOCYTES 28 21 - 52 % MONOCYTES 8 3 - 10 % EOSINOPHILS 3 0 - 5 % BASOPHILS 1 0 - 2 %  
 ABS. NEUTROPHILS 2.5 1.8 - 8.0 K/UL  
 ABS. LYMPHOCYTES 1.2 0.9 - 3.6 K/UL ABS. MONOCYTES 0.3 0.05 - 1.2 K/UL  
 ABS. EOSINOPHILS 0.1 0.0 - 0.4 K/UL  
 ABS. BASOPHILS 0.0 0.0 - 0.1 K/UL  
 DF AUTOMATED METABOLIC PANEL, COMPREHENSIVE Result Value Ref Range Sodium 137 136 - 145 mmol/L Potassium 4.3 3.5 - 5.5 mmol/L Chloride 103 100 - 108 mmol/L  
 CO2 26 21 - 32 mmol/L Anion gap 8 3.0 - 18 mmol/L Glucose 172 (H) 74 - 99 mg/dL BUN 12 7.0 - 18 MG/DL Creatinine 0.78 0.6 - 1.3 MG/DL  
 BUN/Creatinine ratio 15 12 - 20 GFR est AA >60 >60 ml/min/1.73m2 GFR est non-AA >60 >60 ml/min/1.73m2 Calcium 8.5 8.5 - 10.1 MG/DL Bilirubin, total 0.7 0.2 - 1.0 MG/DL  
 ALT (SGPT) 24 16 - 61 U/L  
 AST (SGOT) 13 (L) 15 - 37 U/L Alk. phosphatase 92 45 - 117 U/L Protein, total 7.8 6.4 - 8.2 g/dL Albumin 3.9 3.4 - 5.0 g/dL Globulin 3.9 2.0 - 4.0 g/dL A-G Ratio 1.0 0.8 - 1.7 TSH 3RD GENERATION Result Value Ref Range TSH 2.30 0.36 - 3.74 uIU/mL  
LIPID PANEL Result Value Ref Range LIPID PROFILE Cholesterol, total 113 <200 MG/DL Triglyceride 65 <150 MG/DL  
 HDL Cholesterol 36 (L) 40 - 60 MG/DL  
 LDL, calculated 64 0 - 100 MG/DL VLDL, calculated 13 MG/DL  
 CHOL/HDL Ratio 3.1 0 - 5.0 HEMOGLOBIN A1C WITH EAG Result Value Ref Range Hemoglobin A1c 9.0 (H) 4.2 - 5.6 % Est. average glucose 212 mg/dL MICROALBUMIN, UR, RAND W/ MICROALB/CREAT RATIO Result Value Ref Range Microalbumin,urine random 64.60 (H) 0 - 3.0 MG/DL Creatinine, urine 126.00 (H) 30 - 125 mg/dL Microalbumin/Creat ratio (mg/g creat) 513 (H) 0 - 30 mg/g ASSESSMENT and PLAN 
  ICD-10-CM ICD-9-CM 1. Type 2 diabetes mellitus with hyperglycemia, without long-term current use of insulin ([facility]) E11.65 250.00   
  790.29 2. Comprehensive diabetic foot examination, type 2 DM, encounter [facility] E11.9 250.00  DIABETES FOOT EXAM  
3. Impacted cerumen of left ear H61.22 380.4 REMOVAL IMPACTED CERUMEN IRRIGATION/LVG UNILAT 4. Essential hypertension I10 401.9 5. Dyslipidemia E78.5 272.4   
 
lab results and schedule of future lab studies reviewed with patient 
reviewed diet, exercise and weight control 
cardiovascular risk and specific lipid/LDL goals reviewed 
reviewed medications and side effects in detail 
specific diabetic recommendations: low cholesterol diet, weight control and daily exercise discussed, home glucose monitoring emphasized, all medications, side effects and compliance discussed carefully, foot care discussed and Podiatry visits discussed, annual eye examinations at Ophthalmology discussed, glycohemoglobin and other lab monitoring discussed and long term diabetic complications discussed I have discussed the diagnosis with the patient and the intended plan of care as seen in the above orders. The patient has received an after-visit summary and questions were answered concerning future plans. I have discussed medication, side effects, and warnings with the patient in detail. The patient verbalized understanding and is in agreement with the plan of care. The patient will contact the office with any additional concerns.  
 
Ronaldo Beckham MD

## 2019-04-04 ENCOUNTER — APPOINTMENT (OUTPATIENT)
Dept: ENDOCRINOLOGY | Facility: CLINIC | Age: 64
End: 2019-04-04
Payer: COMMERCIAL

## 2019-04-04 ENCOUNTER — APPOINTMENT (OUTPATIENT)
Dept: OPHTHALMOLOGY | Facility: CLINIC | Age: 64
End: 2019-04-04
Payer: COMMERCIAL

## 2019-04-04 VITALS
SYSTOLIC BLOOD PRESSURE: 130 MMHG | DIASTOLIC BLOOD PRESSURE: 80 MMHG | OXYGEN SATURATION: 98 % | HEART RATE: 79 BPM | HEIGHT: 71 IN | WEIGHT: 204.38 LBS | BODY MASS INDEX: 28.61 KG/M2

## 2019-04-04 DIAGNOSIS — H40.003 PREGLAUCOMA, UNSPECIFIED, BILATERAL: ICD-10-CM

## 2019-04-04 PROCEDURE — 82962 GLUCOSE BLOOD TEST: CPT

## 2019-04-04 PROCEDURE — 76514 ECHO EXAM OF EYE THICKNESS: CPT

## 2019-04-04 PROCEDURE — 92133 CPTRZD OPH DX IMG PST SGM ON: CPT

## 2019-04-04 PROCEDURE — 83036 HEMOGLOBIN GLYCOSYLATED A1C: CPT | Mod: QW

## 2019-04-04 PROCEDURE — 92004 COMPRE OPH EXAM NEW PT 1/>: CPT

## 2019-04-04 PROCEDURE — 99214 OFFICE O/P EST MOD 30 MIN: CPT | Mod: 25

## 2019-04-04 RX ORDER — METFORMIN ER 500 MG 500 MG/1
500 TABLET ORAL
Qty: 360 | Refills: 1 | Status: DISCONTINUED | COMMUNITY
Start: 2018-02-05 | End: 2019-04-04

## 2019-04-04 RX ORDER — TRAMADOL HYDROCHLORIDE 50 MG/1
50 TABLET, COATED ORAL
Qty: 90 | Refills: 0 | Status: DISCONTINUED | COMMUNITY
Start: 2017-11-20 | End: 2019-04-04

## 2019-04-04 RX ORDER — MELOXICAM 15 MG/1
15 TABLET ORAL
Qty: 30 | Refills: 0 | Status: DISCONTINUED | COMMUNITY
Start: 2017-11-09 | End: 2019-04-04

## 2019-04-04 RX ORDER — TRAMADOL HYDROCHLORIDE 50 MG/1
50 TABLET, COATED ORAL
Qty: 90 | Refills: 0 | Status: DISCONTINUED | COMMUNITY
Start: 2017-11-09 | End: 2019-04-04

## 2019-04-04 NOTE — HISTORY OF PRESENT ILLNESS
[FreeTextEntry1] : 62 yo male with hx of T2DM controlled (Hb A1C 9% 3/19/19) without complications x 5 years. He currently takes metformin ER 1000mg po bid and his new PCP started him on jardiance 2 weeks ago. His bs have been in the 130-140s in the am since starting. His previous am bs were 180-220s. Has not seen optho in over a year. 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 He sees a PCP in [facility] as he has been living in [place]. \par He denies any cp//palpations. He also denies blurred vision/diplopia.\par

## 2019-04-04 NOTE — ASSESSMENT
[FreeTextEntry1] : 64 yo male with hx of t2dm controlled (HbA1C 9% on 3/19) with microalbuminuria here for f/u.\par 1) T2DM: Test bs 1-2x/day.\par Continue metfromin er 1000mg po bid and jardiance 10mg poq daily. \par ACE inhibitor for renal protection.\par He refuses flu shot as he has gotten sick in the past.\par Needs to see optho: ADALI made an appt for today at 1130am at [address]par 2) HTN: BP at goal today, less than 140/80. Continue ACEi.\par 3) Dyslipidemia: Statin therapy, zocor 10mg po qhs. LDL at goal (less than 100) on labwork from 3/18.\par F/u in 4 months. [Retinopathy Screening] : Patient was referred to ophthalmology for retinopathy screening

## 2019-04-05 RX ORDER — LANCETS 28 GAUGE
EACH MISCELLANEOUS
Qty: 2 | Refills: 3 | Status: ACTIVE | COMMUNITY
Start: 2019-04-05 | End: 1900-01-01

## 2019-04-08 RX ORDER — METFORMIN HYDROCHLORIDE 500 MG/1
500 TABLET, EXTENDED RELEASE ORAL TWICE DAILY
Qty: 180 | Refills: 1 | Status: DISCONTINUED | COMMUNITY
Start: 2019-04-04 | End: 2019-04-08

## 2019-06-06 RX ORDER — BLOOD SUGAR DIAGNOSTIC
STRIP MISCELLANEOUS TWICE DAILY
Qty: 2 | Refills: 2 | Status: ACTIVE | COMMUNITY
Start: 2019-04-05 | End: 1900-01-01

## 2019-06-16 DIAGNOSIS — E78.5 DYSLIPIDEMIA: ICD-10-CM

## 2019-06-16 DIAGNOSIS — I10 ESSENTIAL HYPERTENSION: ICD-10-CM

## 2019-06-18 RX ORDER — EMPAGLIFLOZIN 10 MG/1
TABLET, FILM COATED ORAL
Qty: 30 TAB | Refills: 0 | Status: SHIPPED | OUTPATIENT
Start: 2019-06-18 | End: 2019-07-15 | Stop reason: SDUPTHER

## 2019-06-18 RX ORDER — SIMVASTATIN 10 MG/1
TABLET, FILM COATED ORAL
Qty: 30 TAB | Refills: 0 | Status: ON HOLD | OUTPATIENT
Start: 2019-06-18 | End: 2019-07-10

## 2019-06-18 RX ORDER — ENALAPRIL MALEATE 5 MG/1
TABLET ORAL
Qty: 30 TAB | Refills: 0 | Status: ON HOLD | OUTPATIENT
Start: 2019-06-18 | End: 2019-07-10

## 2019-07-02 ENCOUNTER — OFFICE VISIT (OUTPATIENT)
Dept: SURGERY | Age: 64
End: 2019-07-02

## 2019-07-02 VITALS
BODY MASS INDEX: 28 KG/M2 | WEIGHT: 200 LBS | HEART RATE: 73 BPM | HEIGHT: 71 IN | OXYGEN SATURATION: 97 % | RESPIRATION RATE: 16 BRPM

## 2019-07-02 DIAGNOSIS — Z12.11 COLON CANCER SCREENING: Primary | ICD-10-CM

## 2019-07-02 NOTE — PROGRESS NOTES
Review of Systems   Constitutional: Negative. HENT: Negative. Eyes: Negative. Respiratory: Positive for cough. Negative for hemoptysis, sputum production, shortness of breath and wheezing. Cardiovascular: Negative. Gastrointestinal: Negative. Genitourinary: Negative. Musculoskeletal: Negative. Skin: Negative. Neurological: Negative. Endo/Heme/Allergies: Negative. Psychiatric/Behavioral: Negative. Colon Screen    Patient: Alison Serrano MRN: [id number]  SSN: [id number]    YOB: 1955  Age: 59 y.o. Sex: male        Subjective:   Alison Serrano was referred by his PCP, Suzette Weir MD.  Patient referred for colonoscopy for   Screening colonoscopy. Patient denies rectal pain or bleeding. Abdominal surgeries as described below, specifically cholecystectomy. Family history as described below, specifically none. Last colonoscopy was 10 years ago, patient doesn't recall who performed it. No Known Allergies    Past Medical History:   Diagnosis Date    Borderline type 2 diabetes mellitus     Hypertension      Past Surgical History:   Procedure Laterality Date    HX CHOLECYSTECTOMY  2016    HX COLONOSCOPY  2009    HX HIP REPLACEMENT  2017    BOTH RIGHT AND LEFT       Family History   Problem Relation Age of Onset    Diabetes Mother     Hypertension Mother     Cancer Sister     Cancer Sister      Social History     Tobacco Use    Smoking status: Never Smoker    Smokeless tobacco: Never Used   Substance Use Topics    Alcohol use: Yes      Prior to Admission medications    Medication Sig Start Date End Date Taking?  Authorizing Provider   JARDIANCE 10 mg tablet TAKE 1 TABLET BY MOUTH DAILY 6/18/19  Yes Suzette Weir MD   enalapril (VASOTEC) 5 mg tablet TAKE 1 TABLET ONCE A DAY 6/18/19  Yes Maia Wood MD   simvastatin (ZOCOR) 10 mg tablet TAKE 1 TABLET BY MOUTH NIGHTLY 6/18/19  Yes Maia Sen MD   simvastatin (ZOCOR) 10 mg tablet TAKE 1 TABLET BY MOUTH NIGHTLY 3/19/19  Yes Maia Sen MD   enalapril (VASOTEC) 5 mg tablet TAKE 1 TABLET ONCE A DAY 3/19/19  Yes Maia Cardenas MD   metFORMIN (GLUCOPHAGE) 1,000 mg tablet Take 1,000 mg by mouth two (2) times daily (with meals). Yes Provider, Historical   carbamide peroxide (DEBROX) 6.5 % otic solution Administer 5 Drops into each ear two (2) times a day. 3/18/19   Maia Sen MD          Review of Systems:      Risks colonoscopy described- colon injury, missed lesion, anesthesia problems, bleeding       Kylee Barbosa, LPN  July 2, 5617  0:26 PM

## 2019-07-09 ENCOUNTER — ANESTHESIA EVENT (OUTPATIENT)
Dept: ENDOSCOPY | Age: 64
End: 2019-07-09
Payer: COMMERCIAL

## 2019-07-10 ENCOUNTER — ANESTHESIA (OUTPATIENT)
Dept: ENDOSCOPY | Age: 64
End: 2019-07-10
Payer: COMMERCIAL

## 2019-07-10 ENCOUNTER — HOSPITAL ENCOUNTER (OUTPATIENT)
Age: 64
Setting detail: OUTPATIENT SURGERY
Discharge: HOME | End: 2019-07-10
Attending: COLON & RECTAL SURGERY | Admitting: COLON & RECTAL SURGERY
Payer: COMMERCIAL

## 2019-07-10 VITALS
SYSTOLIC BLOOD PRESSURE: 128 MMHG | DIASTOLIC BLOOD PRESSURE: 77 MMHG | HEART RATE: 80 BPM | TEMPERATURE: 98.4 F | OXYGEN SATURATION: 97 % | RESPIRATION RATE: 23 BRPM | WEIGHT: 210 LBS | HEIGHT: 69 IN | BODY MASS INDEX: 31.1 KG/M2

## 2019-07-10 LAB — GLUCOSE BLD STRIP.AUTO-MCNC: 112 MG/DL (ref 70–110)

## 2019-07-10 PROCEDURE — 74011250636 HC RX REV CODE- 250/636

## 2019-07-10 PROCEDURE — 76040000007: Performed by: COLON & RECTAL SURGERY

## 2019-07-10 PROCEDURE — 82962 GLUCOSE BLOOD TEST: CPT

## 2019-07-10 PROCEDURE — 88305 TISSUE EXAM BY PATHOLOGIST: CPT

## 2019-07-10 PROCEDURE — 77030013995 HC SNR POLYP ENDOSC OCOA -A: Performed by: COLON & RECTAL SURGERY

## 2019-07-10 PROCEDURE — 74011250636 HC RX REV CODE- 250/636: Performed by: NURSE ANESTHETIST, CERTIFIED REGISTERED

## 2019-07-10 PROCEDURE — 76060000032 HC ANESTHESIA 0.5 TO 1 HR: Performed by: COLON & RECTAL SURGERY

## 2019-07-10 RX ORDER — INSULIN LISPRO 100 [IU]/ML
INJECTION, SOLUTION INTRAVENOUS; SUBCUTANEOUS ONCE
Status: DISCONTINUED | OUTPATIENT
Start: 2019-07-10 | End: 2019-07-10 | Stop reason: HOSPADM

## 2019-07-10 RX ORDER — SODIUM CHLORIDE, SODIUM LACTATE, POTASSIUM CHLORIDE, CALCIUM CHLORIDE 600; 310; 30; 20 MG/100ML; MG/100ML; MG/100ML; MG/100ML
75 INJECTION, SOLUTION INTRAVENOUS CONTINUOUS
Status: DISCONTINUED | OUTPATIENT
Start: 2019-07-10 | End: 2019-07-10 | Stop reason: HOSPADM

## 2019-07-10 RX ORDER — SODIUM CHLORIDE 0.9 % (FLUSH) 0.9 %
5-40 SYRINGE (ML) INJECTION EVERY 8 HOURS
Status: DISCONTINUED | OUTPATIENT
Start: 2019-07-10 | End: 2019-07-10 | Stop reason: HOSPADM

## 2019-07-10 RX ORDER — SODIUM CHLORIDE 0.9 % (FLUSH) 0.9 %
5-40 SYRINGE (ML) INJECTION AS NEEDED
Status: DISCONTINUED | OUTPATIENT
Start: 2019-07-10 | End: 2019-07-10 | Stop reason: HOSPADM

## 2019-07-10 RX ORDER — LIDOCAINE HYDROCHLORIDE 10 MG/ML
0.1 INJECTION, SOLUTION EPIDURAL; INFILTRATION; INTRACAUDAL; PERINEURAL AS NEEDED
Status: DISCONTINUED | OUTPATIENT
Start: 2019-07-10 | End: 2019-07-10 | Stop reason: HOSPADM

## 2019-07-10 RX ORDER — SODIUM CHLORIDE, SODIUM LACTATE, POTASSIUM CHLORIDE, CALCIUM CHLORIDE 600; 310; 30; 20 MG/100ML; MG/100ML; MG/100ML; MG/100ML
75 INJECTION, SOLUTION INTRAVENOUS CONTINUOUS
Status: CANCELLED | OUTPATIENT
Start: 2019-07-10

## 2019-07-10 RX ORDER — PROPOFOL 10 MG/ML
INJECTION, EMULSION INTRAVENOUS AS NEEDED
Status: DISCONTINUED | OUTPATIENT
Start: 2019-07-10 | End: 2019-07-10 | Stop reason: HOSPADM

## 2019-07-10 RX ORDER — SODIUM CHLORIDE, SODIUM LACTATE, POTASSIUM CHLORIDE, CALCIUM CHLORIDE 600; 310; 30; 20 MG/100ML; MG/100ML; MG/100ML; MG/100ML
INJECTION, SOLUTION INTRAVENOUS
Status: DISCONTINUED | OUTPATIENT
Start: 2019-07-10 | End: 2019-07-10 | Stop reason: HOSPADM

## 2019-07-10 RX ADMIN — PROPOFOL 100 MG: 10 INJECTION, EMULSION INTRAVENOUS at 11:16

## 2019-07-10 RX ADMIN — PROPOFOL 30 MG: 10 INJECTION, EMULSION INTRAVENOUS at 11:40

## 2019-07-10 RX ADMIN — PROPOFOL 30 MG: 10 INJECTION, EMULSION INTRAVENOUS at 11:32

## 2019-07-10 RX ADMIN — PROPOFOL 30 MG: 10 INJECTION, EMULSION INTRAVENOUS at 11:20

## 2019-07-10 RX ADMIN — PROPOFOL 30 MG: 10 INJECTION, EMULSION INTRAVENOUS at 11:36

## 2019-07-10 RX ADMIN — PROPOFOL 30 MG: 10 INJECTION, EMULSION INTRAVENOUS at 11:25

## 2019-07-10 RX ADMIN — FAMOTIDINE 20 MG: 10 INJECTION INTRAVENOUS at 10:55

## 2019-07-10 RX ADMIN — SODIUM CHLORIDE, SODIUM LACTATE, POTASSIUM CHLORIDE, CALCIUM CHLORIDE: 600; 310; 30; 20 INJECTION, SOLUTION INTRAVENOUS at 11:09

## 2019-07-10 RX ADMIN — PROPOFOL 30 MG: 10 INJECTION, EMULSION INTRAVENOUS at 11:22

## 2019-07-10 RX ADMIN — PROPOFOL 50 MG: 10 INJECTION, EMULSION INTRAVENOUS at 11:18

## 2019-07-10 RX ADMIN — SODIUM CHLORIDE, SODIUM LACTATE, POTASSIUM CHLORIDE, AND CALCIUM CHLORIDE 75 ML/HR: 600; 310; 30; 20 INJECTION, SOLUTION INTRAVENOUS at 10:55

## 2019-07-10 RX ADMIN — PROPOFOL 30 MG: 10 INJECTION, EMULSION INTRAVENOUS at 11:28

## 2019-07-10 NOTE — DISCHARGE INSTRUCTIONS
Colonoscopy: What to Expect at [address] [place] [address]  After you have a colonoscopy, you will stay at the clinic for 1 to 2 hours until the medicines wear off. Then you can go home. But you will need to arrange for a ride. Your doctor will tell you when you can eat and do your other usual activities. Your doctor will talk to you about when you will need your next colonoscopy. Your doctor can help you decide how often you need to be checked. This will depend on the results of your test and your risk for colorectal cancer. After the test, you may be bloated or have gas pains. You may need to pass gas. If a biopsy was done or a polyp was removed, you may have streaks of blood in your stool (feces) for a few days. This care sheet gives you a general idea about how long it will take for you to recover. But each person recovers at a different pace. Follow the steps below to get better as quickly as possible. How can you care for yourself at home? Activity  · Rest when you feel tired. · You can do your normal activities when it feels okay to do so. Diet  · Follow your doctor's directions for eating. · Unless your doctor has told you not to, drink plenty of fluids. This helps to replace the fluids that were lost during the colon prep. · Do not drink alcohol. Medicines  · If polyps were removed or a biopsy was done during the test, your doctor may tell you not to take aspirin or other anti-inflammatory medicines for a few days. These include ibuprofen (Advil, Motrin) and naproxen (Aleve). Other instructions  · For your safety, do not drive or operate machinery until the medicine wears off and you can think clearly. Your doctor may tell you not to drive or operate machinery until the day after your test.  · Do not sign legal documents or make major decisions until the medicine wears off and you can think clearly. The anesthesia can make it hard for you to fully understand what you are agreeing to.   Follow-up care is a key part of your treatment and safety. Be sure to make and go to all appointments, and call your doctor if you are having problems. It's also a good idea to know your test results and keep a list of the medicines you take. When should you call for help? Call 911 anytime you think you may need emergency care. For example, call if:  · You passed out (lost consciousness). · You pass maroon or bloody stools. · You have severe belly pain. Call your doctor now or seek immediate medical care if:  · Your stools are black and tarlike. · Your stools have streaks of blood, but you did not have a biopsy or any polyps removed. · You have belly pain, or your belly is swollen and firm. · You vomit. · You have a fever. · You are very dizzy. Watch closely for changes in your health, and be sure to contact your doctor if you have any problems. Where can you learn more? Go to Aviir.be  Enter E264 in the search box to learn more about \"Colonoscopy: What to Expect at Home. \"   © 1412-4410 Healthwise, Incorporated. Care instructions adapted under license by [employer] (which disclaims liability or warranty for this information). This care instruction is for use with your licensed healthcare professional. If you have questions about a medical condition or this instruction, always ask your healthcare professional. [address] any warranty or liability for your use of this information. Content Version: 85.7.910508; Current as of: November 14, 2014    Patient Education      Colon Polyps: Care Instructions  Your Care Instructions    Colon polyps are growths in the colon or the rectum. The cause of most colon polyps is not known, and most people who get them do not have any problems. But a certain kind can turn into cancer. For this reason, regular testing for colon polyps is important for people age 48 and older and anyone who has an increased risk for colon cancer.   Polyps are usually found through routine colon cancer screening tests. Although most colon polyps are not cancerous, they are usually removed and then tested for cancer. Screening for colon cancer saves lives because the cancer can usually be cured if it is caught early. If you have a polyp that is the type that can turn into cancer, you may need more tests to examine your entire colon. The doctor will remove any other polyps that he or she finds, and you will be tested more often. Follow-up care is a key part of your treatment and safety. Be sure to make and go to all appointments, and call your doctor if you are having problems. It's also a good idea to know your test results and keep a list of the medicines you take. How can you care for yourself at home? Regular exams to look for colon polyps are the best way to prevent polyps from turning into colon cancer. These can include stool tests, sigmoidoscopy, colonoscopy, and CT colonography. Talk with your doctor about a testing schedule that is right for you. To prevent polyps  There is no home treatment that can prevent colon polyps. But these steps may help lower your risk for cancer. · Stay active. Being active can help you get to and stay at a healthy weight. Try to exercise on most days of the week. Walking is a good choice. · Eat well. Choose a variety of vegetables, fruits, legumes (such as peas and beans), fish, poultry, and whole grains. · Do not smoke. If you need help quitting, talk to your doctor about stop-smoking programs and medicines. These can increase your chances of quitting for good. · If you drink alcohol, limit how much you drink. Limit alcohol to 2 drinks a day for men and 1 drink a day for women. When should you call for help?   Call your doctor now or seek immediate medical care if:    · You have severe belly pain.     · Your stools are maroon or very bloody.    Watch closely for changes in your health, and be sure to contact your doctor if:    · You have a fever.     · You have nausea or vomiting.     · You have a change in bowel habits (new constipation or diarrhea).     · Your symptoms get worse or are not improving as expected. Where can you learn more? Go to http://sheree-russ.info/. Enter 95 [id number] in the search box to learn more about \"Colon Polyps: Care Instructions. \"  Current as of: March 27, 2018  Content Version: 11.9  © 5218-0582 [employer]. Care instructions adapted under license by [employer] (which disclaims liability or warranty for this information). If you have questions about a medical condition or this instruction, always ask your healthcare professional. [place] [address] any warranty or liability for your use of this information. DISCHARGE SUMMARY from Nurse     POST-PROCEDURE INSTRUCTIONS:    Call your Physician if you:  ? Observe any excess bleeding. ? Develop a temperature over 100.5o F.  ? Experience abdominal, shoulder or chest pain. ? Notice any signs of decreased circulation or nerve impairment to an extremity such as a change in color, persistent numbness, tingling, coldness or increase in pain. ? Vomit blood or you have nausea and vomiting lasting longer than 4 hours. ? Are unable to take medications. ? Are unable to urinate within 8 hours after discharge following general anesthesia or intravenous sedation. For the next 24 hours after receiving general anesthesia or intravenous sedation, or while taking prescription Narcotics, limit your activities:  ? Do NOT drive a motor vehicle, operate hazard machinery or power tools, or perform tasks that require coordination. The medication you received during your procedure may have some effect on your mental awareness. ? Do NOT make important personal or business decisions. The medication you received during your procedure may have some effect on your mental awareness.   ? Do NOT drink alcoholic beverages. These drinks do not mix well with the medications that have been given to you. ? Upon discharge from the hospital, you must be accompanied by a responsible adult. ? Resume your diet as directed by your physician. ? Resume medications as your physician has prescribed. ? Please give a list of your current medications to your Primary Care Provider. ? Please update this list whenever your medications are discontinued, doses are changed, or new medications (including over-the-counter products) are added. ? Please carry medication information at all times in case of emergency situations. These are general instructions for a healthy lifestyle:    No smoking/ No tobacco products/ Avoid exposure to second hand smoke.  Surgeon General's Warning:  Quitting smoking now greatly reduces serious risk to your health. Obesity, smoking, and a sedentary lifestyle greatly increase your risk for illness.  A healthy diet, regular physical exercise & weight monitoring are important for maintaining a healthy lifestyle   You may be retaining fluid if you have a history of heart failure or if you experience any of the following symptoms:  Weight gain of 3 pounds or more overnight or 5 pounds in a week, increased swelling in our hands or feet or shortness of breath while lying flat in bed. Please call your doctor as soon as you notice any of these symptoms; do not wait until your next office visit. Recognize signs and symptoms of STROKE:  F  -  Face looks uneven  A  -  Arms unable to move or move unevenly  S  -  Speech slurred or non-existent  T  -  Time to call 911 - as soon as signs and symptoms begin - DO NOT go back to bed or wait to see If you get better - TIME IS BRAIN. Colorectal Screening   Colorectal cancer almost always develops from precancerous polyps (abnormal growths) in the colon or rectum.   Screening tests can find precancerous polyps, so that they can be removed before they turn into cancer. Screening tests can also find colorectal cancer early, when treatment works best.  [facility] Speak with your physician about when you should begin screening and how often you should be tested. Additional Information    If you have questions, please call [phone]. Remember, MyChart is NOT to be used for urgent needs. For medical emergencies, dial 911. Educational references and/or instructions provided during this visit included:    Colon Polyps      APPOINTMENTS:    Appointment in: Other (Specify) No aspirin or ibuprofen (e.g. Aleve, Motrin, Advil) for 5 days. Repeat colonoscopy in 5 years    Patient Education        Colon Polyps: Care Instructions  Your Care Instructions    Colon polyps are growths in the colon or the rectum. The cause of most colon polyps is not known, and most people who get them do not have any problems. But a certain kind can turn into cancer. For this reason, regular testing for colon polyps is important for people age 48 and older and anyone who has an increased risk for colon cancer. Polyps are usually found through routine colon cancer screening tests. Although most colon polyps are not cancerous, they are usually removed and then tested for cancer. Screening for colon cancer saves lives because the cancer can usually be cured if it is caught early. If you have a polyp that is the type that can turn into cancer, you may need more tests to examine your entire colon. The doctor will remove any other polyps that he or she finds, and you will be tested more often. Follow-up care is a key part of your treatment and safety. Be sure to make and go to all appointments, and call your doctor if you are having problems. It's also a good idea to know your test results and keep a list of the medicines you take. How can you care for yourself at home? Regular exams to look for colon polyps are the best way to prevent polyps from turning into colon cancer.  These can include stool tests, sigmoidoscopy, colonoscopy, and CT colonography. Talk with your doctor about a testing schedule that is right for you. To prevent polyps  There is no home treatment that can prevent colon polyps. But these steps may help lower your risk for cancer. · Stay active. Being active can help you get to and stay at a healthy weight. Try to exercise on most days of the week. Walking is a good choice. · Eat well. Choose a variety of vegetables, fruits, legumes (such as peas and beans), fish, poultry, and whole grains. · Do not smoke. If you need help quitting, talk to your doctor about stop-smoking programs and medicines. These can increase your chances of quitting for good. · If you drink alcohol, limit how much you drink. Limit alcohol to 2 drinks a day for men and 1 drink a day for women. When should you call for help? Call your doctor now or seek immediate medical care if:    · You have severe belly pain.     · Your stools are maroon or very bloody.    Watch closely for changes in your health, and be sure to contact your doctor if:    · You have a fever.     · You have nausea or vomiting.     · You have a change in bowel habits (new constipation or diarrhea).     · Your symptoms get worse or are not improving as expected. Where can you learn more? Go to http://sheree-russ.info/. Enter 95 [id number] in the search box to learn more about \"Colon Polyps: Care Instructions. \"  Current as of: March 27, 2018  Content Version: 11.9  © 5620-9959 [employer] Incorporated. Care instructions adapted under license by [employer] (which disclaims liability or warranty for this information). If you have questions about a medical condition or this instruction, always ask your healthcare professional. [place] [address] any warranty or liability for your use of this information. Discharge information has been reviewed with the patient and family.   The patient and family verbalized understanding.

## 2019-07-10 NOTE — ANESTHESIA POSTPROCEDURE EVALUATION
Procedure(s):  COLONOSCOPY with polypectomy. MAC    Anesthesia Post Evaluation      Multimodal analgesia: multimodal analgesia used between 6 hours prior to anesthesia start to PACU discharge  Patient location during evaluation: bedside  Patient participation: complete - patient participated  Level of consciousness: awake  Pain management: adequate  Airway patency: patent  Anesthetic complications: no  Cardiovascular status: stable  Respiratory status: acceptable  Hydration status: acceptable  Post anesthesia nausea and vomiting:  controlled      Vitals Value Taken Time   /62 7/10/2019 12:02 PM   Temp     Pulse 85 7/10/2019 12:04 PM   Resp 26 7/10/2019 12:04 PM   SpO2 98 % 7/10/2019 12:04 PM   Vitals shown include unvalidated device data.

## 2019-07-10 NOTE — H&P
HPI: Domenica Ragland. is a 62 y.o. male presenting with chief complain of need for screening for CRC. Past Medical History:   Diagnosis Date    Borderline type 2 diabetes mellitus     Diabetes ([address]     Hypertension        Past Surgical History:   Procedure Laterality Date    HX CHOLECYSTECTOMY  2016    HX COLONOSCOPY  2009    HX GI      galbladder removal    HX HIP REPLACEMENT  2017    BOTH RIGHT AND LEFT     HX ORTHOPAEDIC  2009 & 2016    bilateral hip replacements       Family History   Problem Relation Age of Onset    Diabetes Mother     Hypertension Mother     Cancer Sister     Cancer Sister        Social History     Socioeconomic History    Marital status:      Spouse name: Not on file    Number of children: Not on file    Years of education: Not on file    Highest education level: Not on file   Tobacco Use    Smoking status: Never Smoker    Smokeless tobacco: Never Used   Substance and Sexual Activity    Alcohol use: Yes     Comment: 2-3/ wk    Drug use: No    Sexual activity: Never       Review of Systems - neg    Outpatient Medications Marked as Taking for the 7/10/19 encounter [facility] Encounter)   Medication Sig Dispense Refill    JARDIANCE 10 mg tablet TAKE 1 TABLET BY MOUTH DAILY 30 Tab 0    simvastatin (ZOCOR) 10 mg tablet TAKE 1 TABLET BY MOUTH NIGHTLY 90 Tab 2    enalapril (VASOTEC) 5 mg tablet TAKE 1 TABLET ONCE A DAY 90 Tab 2    metFORMIN (GLUCOPHAGE) 1,000 mg tablet Take 1,000 mg by mouth two (2) times daily (with meals). No Known Allergies    Vitals:    07/09/19 0906 07/10/19 1036 07/10/19 1041   BP:   139/88   Pulse:   74   Resp:   16   Temp:   98.4 °F (36.9 °C)   SpO2:   100%   Weight: 95.3 kg (210 lb) 95.3 kg (210 lb)    Height: 5' 9\" (1.753 m) 5' 9\" (1.753 m)        Physical Exam   Constitutional: He appears well-developed and well-nourished. HENT:   Head: Normocephalic and atraumatic. Eyes: Conjunctivae and EOM are normal.   Abdominal: Soft. He exhibits no distension and no mass. There is no tenderness. Musculoskeletal: Normal range of motion. Lymphadenopathy:     He has no cervical adenopathy. Right: No inguinal adenopathy present. Left: No inguinal adenopathy present. Neurological: No sensory deficit. Skin: Skin is warm and dry. Psychiatric: He has a normal mood and affect. His speech is normal.       Assessment / Plan    colonoscopy    The diagnoses and plan were discussed with the patient. All questions answered. Plan of care agreed to by all concerned.

## 2019-07-10 NOTE — ANESTHESIA PREPROCEDURE EVALUATION
Relevant Problems   No relevant active problems       Anesthetic History   No history of anesthetic complications            Review of Systems / Medical History  Patient summary reviewed and pertinent labs reviewed    Pulmonary  Within defined limits                 Neuro/Psych   Within defined limits           Cardiovascular    Hypertension              Exercise tolerance: >4 METS     GI/Hepatic/Renal  Within defined limits              Endo/Other    Diabetes: well controlled, type 2         Other Findings              Physical Exam    Airway  Mallampati: II  TM Distance: 4 - 6 cm  Neck ROM: normal range of motion   Mouth opening: Normal     Cardiovascular    Rhythm: regular  Rate: normal         Dental  No notable dental hx       Pulmonary  Breath sounds clear to auscultation               Abdominal  GI exam deferred       Other Findings            Anesthetic Plan    ASA: 3  Anesthesia type: MAC            Anesthetic plan and risks discussed with: Patient

## 2019-07-10 NOTE — PROCEDURES
[facility]  [address], [address]   [place], [address]  [phone]                    Colonoscopy Procedure Note      Kennedy De Jesus  [birth year]  [id number]                Date of Procedure: 7/10/2019    Preoperative diagnosis: Z12.11,   Colon cancer Screening    Postoperative diagnosis: transverse polyp    :  Scarlett Chapman MD    Assistant(s): Endoscopy RN-1: Sandra Alberts RN; Yovani Coughlin RN; Darrick Avitia RN    Sedation: MAC    Complications: None    Implants: None    Procedure Details:  Prior to the procedure, a history and physical were performed. The patients medications, allergies and sensitivities were reviewed and all questions were answered. After informed consent was obtained for the procedure, with all risks and benefits of procedure explained. The patient was taken to the endoscopy suite and placed in the left lateral decubitus position. Patient identification and proposed procedure were verified prior to the procedure by the nurse and I. After sequential anesthesia administered by anesthesiologist, a digital rectal exam was performed and was normal.  The Olympus video colonoscope was introduced through the anus and advanced to cecum, which was identified by the ileocecal valve and appendiceal orifice. The quality of preparation was excellent. The colonoscope was slowly withdrawn and the mucosa examined for any abnormalities. Cecal withdrawal time was greater than 6 minutes. The patient tolerated the procedure well. There were no complications. Findings/Interventions:   Polyps - #1, 5 mm in size, located in the transverse colon, removed by snare cautery and retrieved for pathology    EBL: none    Recommendations: -Repeat colonoscopy in 5 years.    NO aspirin for 5 days     Discharge Disposition:  Yaw Andino MD  7/10/2019  11:45 AM

## 2019-07-15 DIAGNOSIS — I10 ESSENTIAL HYPERTENSION: ICD-10-CM

## 2019-07-15 DIAGNOSIS — E78.5 DYSLIPIDEMIA: ICD-10-CM

## 2019-07-16 RX ORDER — ENALAPRIL MALEATE 5 MG/1
TABLET ORAL
Qty: 90 TAB | Refills: 2 | Status: SHIPPED | OUTPATIENT
Start: 2019-07-16 | End: 2020-04-20

## 2019-07-16 RX ORDER — SIMVASTATIN 10 MG/1
TABLET, FILM COATED ORAL
Qty: 90 TAB | Refills: 2 | Status: SHIPPED | OUTPATIENT
Start: 2019-07-16 | End: 2020-04-23 | Stop reason: SDUPTHER

## 2019-07-30 ENCOUNTER — TELEPHONE (OUTPATIENT)
Dept: SURGERY | Age: 64
End: 2019-07-30

## 2019-07-30 NOTE — TELEPHONE ENCOUNTER
----- Message from Garrett Paris MD sent at 7/12/2019  2:43 PM EDT -----  Benign polyp(s). Repeat colonoscopy in 5 years as planned. LM x 2 for patient to call us regarding colonoscopy results.

## 2019-08-29 ENCOUNTER — APPOINTMENT (OUTPATIENT)
Dept: ENDOCRINOLOGY | Facility: CLINIC | Age: 64
End: 2019-08-29
Payer: COMMERCIAL

## 2019-08-29 ENCOUNTER — APPOINTMENT (OUTPATIENT)
Dept: OPHTHALMOLOGY | Facility: CLINIC | Age: 64
End: 2019-08-29

## 2019-08-29 VITALS
HEART RATE: 68 BPM | WEIGHT: 200 LBS | HEIGHT: 71 IN | DIASTOLIC BLOOD PRESSURE: 72 MMHG | BODY MASS INDEX: 28 KG/M2 | OXYGEN SATURATION: 97 % | SYSTOLIC BLOOD PRESSURE: 120 MMHG

## 2019-08-29 LAB
GLUCOSE BLDC GLUCOMTR-MCNC: 137
HBA1C MFR BLD HPLC: 6.9

## 2019-08-29 PROCEDURE — 83036 HEMOGLOBIN GLYCOSYLATED A1C: CPT | Mod: QW

## 2019-08-29 PROCEDURE — 99214 OFFICE O/P EST MOD 30 MIN: CPT | Mod: 25

## 2019-08-29 PROCEDURE — 82962 GLUCOSE BLOOD TEST: CPT | Mod: NC

## 2019-08-29 NOTE — ASSESSMENT
[FreeTextEntry1] : 65 yo male with hx of t2dm controlled (HbA1C 6.9%) with microalbuminuria here for f/u.\par 1) T2DM: Test bs 1-2x/day.\par Continue metfromin er 1000mg po bid and jardiance 10mg poq daily. \par ACE inhibitor for renal protection.\par Optho up to date.\par Reviewed need to look for balantitis as he is uncircumcised and using a SGLT2i.\par 2) HTN: BP at goal today, less than 140/80. Continue ACEi.\par 3) Dyslipidemia: Statin therapy\par F/u in 4 months.

## 2019-08-29 NOTE — PHYSICAL EXAM
[Alert] : alert [No Acute Distress] : no acute distress [No Respiratory Distress] : no respiratory distress [Normal Rate and Effort] : normal respiratory rhythm and effort [Clear to Auscultation] : lungs were clear to auscultation bilaterally [Normal Rate] : heart rate was normal  [Normal S1, S2] : normal S1 and S2 [Regular Rhythm] : with a regular rhythm

## 2019-08-29 NOTE — HISTORY OF PRESENT ILLNESS
[FreeTextEntry1] : 63 yo male with hx of T2DM controlled (Hb A1C 6.9%) without complications x 5 years. He currently takes metformin ER 1000mg po bid and jardiance 10mg po qdaily. He denies problems with dysuria or discharge. No issues with ED\lucy Saw optho in April when he came up for his last appointment. No diplopia - some blurred vision which corrects with reading glasses. He does not test his bs. Has made dietary changes and workouts 2x/week for 60-90 minutes. 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 He sees a PCP in [facility] as he has been living in [place]. \par \par

## 2019-11-04 RX ORDER — EMPAGLIFLOZIN 10 MG/1
TABLET, FILM COATED ORAL
Qty: 30 TAB | Refills: 0 | Status: SHIPPED | OUTPATIENT
Start: 2019-11-04 | End: 2019-12-05 | Stop reason: SDUPTHER

## 2019-12-05 RX ORDER — EMPAGLIFLOZIN 10 MG/1
TABLET, FILM COATED ORAL
Qty: 30 TAB | Refills: 0 | Status: SHIPPED | OUTPATIENT
Start: 2019-12-05 | End: 2020-01-05

## 2019-12-05 NOTE — TELEPHONE ENCOUNTER
Patient given Jardiance for 1 month. He needs to come back for follow-up care and for recheck of his labs. No further refills without appointment.   Edwina Bradshaw MD

## 2019-12-18 ENCOUNTER — OFFICE VISIT (OUTPATIENT)
Dept: FAMILY MEDICINE CLINIC | Age: 64
End: 2019-12-18

## 2019-12-18 ENCOUNTER — HOSPITAL ENCOUNTER (OUTPATIENT)
Dept: LAB | Age: 64
Discharge: HOME | End: 2019-12-18
Payer: COMMERCIAL

## 2019-12-18 VITALS
SYSTOLIC BLOOD PRESSURE: 142 MMHG | TEMPERATURE: 96.3 F | HEIGHT: 69 IN | HEART RATE: 85 BPM | OXYGEN SATURATION: 95 % | RESPIRATION RATE: 16 BRPM | BODY MASS INDEX: 30.96 KG/M2 | WEIGHT: 209 LBS | DIASTOLIC BLOOD PRESSURE: 96 MMHG

## 2019-12-18 DIAGNOSIS — E11.65 TYPE 2 DIABETES MELLITUS WITH HYPERGLYCEMIA, WITHOUT LONG-TERM CURRENT USE OF INSULIN (HCC): ICD-10-CM

## 2019-12-18 DIAGNOSIS — E66.9 OBESITY (BMI 30-39.9): ICD-10-CM

## 2019-12-18 DIAGNOSIS — Z11.59 NEED FOR HEPATITIS C SCREENING TEST: ICD-10-CM

## 2019-12-18 DIAGNOSIS — J06.9 UPPER RESPIRATORY TRACT INFECTION, UNSPECIFIED TYPE: ICD-10-CM

## 2019-12-18 DIAGNOSIS — I10 ESSENTIAL HYPERTENSION: ICD-10-CM

## 2019-12-18 DIAGNOSIS — J31.0 PURULENT RHINITIS: Primary | ICD-10-CM

## 2019-12-18 LAB
EST. AVERAGE GLUCOSE BLD GHB EST-MCNC: 157 MG/DL
HBA1C MFR BLD: 7.1 % (ref 4.2–5.6)

## 2019-12-18 PROCEDURE — 83036 HEMOGLOBIN GLYCOSYLATED A1C: CPT

## 2019-12-18 PROCEDURE — 86803 HEPATITIS C AB TEST: CPT

## 2019-12-18 RX ORDER — LORATADINE 10 MG/1
10 TABLET ORAL
Qty: 30 TAB | Refills: 0 | Status: SHIPPED | OUTPATIENT
Start: 2019-12-18 | End: 2020-04-23

## 2019-12-18 RX ORDER — AMOXICILLIN 500 MG/1
500 CAPSULE ORAL 3 TIMES DAILY
Qty: 30 CAP | Refills: 0 | Status: SHIPPED | OUTPATIENT
Start: 2019-12-18 | End: 2019-12-28

## 2019-12-18 RX ORDER — GUAIFENESIN DEXTROMETHORPHAN HYDROBROMIDE ORAL SOLUTION 10; 100 MG/5ML; MG/5ML
10 SOLUTION ORAL
Qty: 236 ML | Refills: 0 | Status: SHIPPED | OUTPATIENT
Start: 2019-12-18 | End: 2020-04-23

## 2019-12-18 NOTE — PROGRESS NOTES
Venipuncture to left forearm at this time. Patient tolerated well at this time. Labs ordered by PCP. No other concerns noted

## 2019-12-18 NOTE — PROGRESS NOTES
Chief Complaint   Patient presents with    Cough    Cold Symptoms     1. Have you been to the ER, urgent care clinic since your last visit? Hospitalized since your last visit? No    2. Have you seen or consulted any other health care providers outside of the [address] [place] [address] since your last visit? Include any pap smears or colon screening. No     HPI  Mack Us. comes in for f/u care. 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 No fever or chills. Cough is non productive. No wheeze or chills. Has throat irritation. He has post nasal drainage and congestion. Postnasal drainage is mucopurulent in nature. Patient states that he takes care of his dad who is unwell. He would like to have an antibiotic given the drainage and nasal congestion. I will put on amoxicillin. This is purulent rhinitis. DM2: Blood glucose numbers have been stable, seen by the endocrinologist and numbers were stable. He is followed up by an endocrinologist in [place]. I will check his HbA1c today. HTN: He is on enalapril. Denies headache or changes in vision or focal weakness. Blood pressure today is slightly elevated. He states that his blood pressure usually is stable. I will have him keep a blood pressure log and I will follow-up at next visit. Obesity: Patient has a BMI of 30.86. He will intensify (dietary modification. We will do screening for hepatitis C today.       Past Medical History  Past Medical History:   Diagnosis Date    Borderline type 2 diabetes mellitus     Diabetes ([facility] [address]     Hypertension        Surgical History  Past Surgical History:   Procedure Laterality Date    COLONOSCOPY N/A 7/10/2019    COLONOSCOPY with polypectomy performed by Jason Moscoso MD at [facility] HX CHOLECYSTECTOMY  2016    HX COLONOSCOPY  2009    HX GI      galbladder removal    HX HIP REPLACEMENT  2017    BOTH RIGHT AND LEFT     HX ORTHOPAEDIC  2009 & 2016    bilateral hip replacements        Medications  Current Outpatient Medications   Medication Sig Dispense Refill    JARDIANCE 10 mg tablet TAKE 1 TABLET BY MOUTH DAILY 30 Tab 0    simvastatin (ZOCOR) 10 mg tablet TAKE 1 TABLET BY MOUTH EVERY DAY 90 Tab 2    simvastatin (ZOCOR) 10 mg tablet TAKE 1 TABLET BY MOUTH NIGHTLY 90 Tab 2    enalapril (VASOTEC) 5 mg tablet TAKE 1 TABLET ONCE A DAY 90 Tab 2    metFORMIN (GLUCOPHAGE) 1,000 mg tablet Take 1,000 mg by mouth two (2) times daily (with meals).       enalapril (VASOTEC) 5 mg tablet TAKE 1 TABLET BY MOUTH EVERY DAY 90 Tab 2       Allergies  No Known Allergies    Family History  Family History   Problem Relation Age of Onset    Diabetes Mother     Hypertension Mother     Cancer Sister     Cancer Sister        Social History  Social History     Socioeconomic History    Marital status:      Spouse name: Not on file    Number of children: Not on file    Years of education: Not on file    Highest education level: Not on file   Occupational History    Not on file   Social Needs    Financial resource strain: Not on file    Food insecurity:     Worry: Not on file     Inability: Not on file    Transportation needs:     Medical: Not on file     Non-medical: Not on file   Tobacco Use    Smoking status: Never Smoker    Smokeless tobacco: Never Used   Substance and Sexual Activity    Alcohol use: Yes     Comment: 2-3/ wk    Drug use: No    Sexual activity: Never   Lifestyle    Physical activity:     Days per week: Not on file     Minutes per session: Not on file    Stress: Not on file   Relationships    Social connections:     Talks on phone: Not on file     Gets together: Not on file     Attends [employer] service: Not on file     Active member of club or organization: Not on file     Attends meetings of clubs or organizations: Not on file     Relationship status: Not on file    Intimate partner violence:     Fear of current or ex partner: Not on file Emotionally abused: Not on file     Physically abused: Not on file     Forced sexual activity: Not on file   Other Topics Concern    Not on file   Social History Narrative    Not on file       Review of Systems  Review of Systems - Review of all systems is negative except as noted above in the HPI. Vital Signs  Visit Vitals  BP (!) 142/96 (BP 1 Location: Left arm, BP Patient Position: Sitting)   Pulse 85   Temp 96.3 °F (35.7 °C) (Oral)   Resp 16   Ht 5' 9\" (1.753 m)   Wt 209 lb (94.8 kg)   SpO2 95%   BMI 30.86 kg/m²         Physical Exam  Physical Examination: General appearance - oriented to person, place, and time, overweight, acyanotic, in no respiratory distress and well hydrated  Mental status - alert, oriented to person, place, and time, normal mood, behavior, speech, dress, motor activity, and thought processes  Eyes - pupils equal and reactive, extraocular eye movements intact  Ears - bilateral TM's and external ear canals normal  Nose - mucosal congestion, mucosal erythema and purulent rhinorrhea  Mouth - mucous membranes moist, pharynx normal without lesions  Neck - supple, no significant adenopathy  Lymphatics - no palpable lymphadenopathy  Chest - clear to auscultation, no wheezes, rales or rhonchi, symmetric air entry  Heart - normal rate and regular rhythm, S1 and S2 normal  Back exam - limited range of motion  Neurological - motor and sensory grossly normal bilaterally  Extremities - no pedal edema noted    Results  Results for orders placed or performed during the hospital encounter of 07/10/19   GLUCOSE, POC   Result Value Ref Range    Glucose (POC) 112 (H) 70 - 110 mg/dL       ASSESSMENT and PLAN    ICD-10-CM ICD-9-CM    1. Purulent rhinitis J31.0 472.0 amoxicillin (AMOXIL) 500 mg capsule      loratadine (CLARITIN) 10 mg tablet   2. Upper respiratory tract infection, unspecified type J06.9 465.9 guaiFENesin-dextromethorphan (GUAIFENESIN-DM)  mg/5 mL liqd   3.  Type 2 diabetes mellitus with hyperglycemia, without long-term current use of insulin (HCC) E11.65 250.00 HEMOGLOBIN A1C WITH EAG     790.29 COLLECTION VENOUS BLOOD,VENIPUNCTURE   4. Essential hypertension I10 401.9    5. Need for hepatitis C screening test Z11.59 V73.89 HEPATITIS C AB      COLLECTION VENOUS BLOOD,VENIPUNCTURE   6. Obesity (BMI 30-39. 9) E66.9 278.00    Discussed the patient's BMI with him. The BMI follow up plan is as follows:     dietary management education, guidance, and counseling  encourage exercise  monitor weight  lab results and schedule of future lab studies reviewed with patient  reviewed diet, exercise and weight control  cardiovascular risk and specific lipid/LDL goals reviewed  reviewed medications and side effects in detail  specific diabetic recommendations: low cholesterol diet, weight control and daily exercise discussed, home glucose monitoring emphasized, all medications, side effects and compliance discussed carefully, annual eye examinations at Ophthalmology discussed, glycohemoglobin and other lab monitoring discussed and long term diabetic complications discussed      I have discussed the diagnosis with the patient and the intended plan of care as seen in the above orders. The patient has received an after-visit summary and questions were answered concerning future plans. I have discussed medication, side effects, and warnings with the patient in detail. The patient verbalized understanding and is in agreement with the plan of care. The patient will contact the office with any additional concerns. Pablo Li MD    PLEASE NOTE:   This document has been produced using voice recognition software.  Unrecognized errors in transcription may be present

## 2019-12-19 LAB
HCV AB SER IA-ACNC: 0.18 INDEX
HCV AB SERPL QL IA: NEGATIVE
HCV COMMENT,HCGAC: NORMAL

## 2019-12-23 NOTE — PROGRESS NOTES
Please let patient know his HbA1c is 7.1. This is an improvement from the previous 9.0. Hepatitis C test is negative. We will continue with the current treatment plan.   Anastasiia Kruger MD

## 2019-12-24 NOTE — PROGRESS NOTES
Called patient at this time. No answer noted. LVM informing patient to return phone call regarding lab results

## 2020-01-05 RX ORDER — EMPAGLIFLOZIN 10 MG/1
TABLET, FILM COATED ORAL
Qty: 30 TAB | Refills: 0 | Status: SHIPPED | OUTPATIENT
Start: 2020-01-05 | End: 2020-02-05

## 2020-02-05 RX ORDER — EMPAGLIFLOZIN 10 MG/1
TABLET, FILM COATED ORAL
Qty: 30 TAB | Refills: 0 | Status: SHIPPED | OUTPATIENT
Start: 2020-02-05 | End: 2020-04-23

## 2020-02-18 ENCOUNTER — RX RENEWAL (OUTPATIENT)
Age: 65
End: 2020-02-18

## 2020-02-27 ENCOUNTER — APPOINTMENT (OUTPATIENT)
Dept: ENDOCRINOLOGY | Facility: CLINIC | Age: 65
End: 2020-02-27
Payer: COMMERCIAL

## 2020-02-27 VITALS
OXYGEN SATURATION: 98 % | HEIGHT: 71 IN | SYSTOLIC BLOOD PRESSURE: 140 MMHG | DIASTOLIC BLOOD PRESSURE: 78 MMHG | HEART RATE: 71 BPM | WEIGHT: 206 LBS | BODY MASS INDEX: 28.84 KG/M2

## 2020-02-27 LAB — GLUCOSE BLDC GLUCOMTR-MCNC: 147

## 2020-02-27 PROCEDURE — 99214 OFFICE O/P EST MOD 30 MIN: CPT | Mod: 25

## 2020-02-27 PROCEDURE — 82962 GLUCOSE BLOOD TEST: CPT | Mod: NC

## 2020-02-27 RX ORDER — EMPAGLIFLOZIN 10 MG/1
10 TABLET, FILM COATED ORAL
Qty: 30 | Refills: 3 | Status: DISCONTINUED | COMMUNITY
Start: 2019-04-04 | End: 2020-02-27

## 2020-02-27 RX ORDER — OXYCODONE AND ACETAMINOPHEN 5; 325 MG/1; MG/1
5-325 TABLET ORAL
Qty: 30 | Refills: 0 | Status: DISCONTINUED | COMMUNITY
Start: 2017-11-09 | End: 2020-02-27

## 2020-02-27 RX ORDER — MUPIROCIN 20 MG/G
2 OINTMENT TOPICAL
Qty: 22 | Refills: 0 | Status: DISCONTINUED | COMMUNITY
Start: 2017-10-18 | End: 2020-02-27

## 2020-02-27 NOTE — ASSESSMENT
[FreeTextEntry1] : 63 yo male with hx of t2dm uncontrolled (HbA1C 7.7%) with microalbuminuria here for f/u.\par 1) T2DM: Test bs 1-2x/day. Poor sleep is likely the cause of his worsening HbA1c.\par He declines the addition of another agent and prefers to do diet/exercise.\par Continue metformin er 1000mg po bid. \par ACE inhibitor for renal protection.\par Optho - due spring 2020. He would like to change providers, so I recommended Dr. Diggs. \par 2) HTN: BP at goal today, less than 130/80. Continue ACEi.\par 3) Dyslipidemia: Continue Statin therapy\par Labs at next visit\par F/u in 3-4 months.

## 2020-02-27 NOTE — HISTORY OF PRESENT ILLNESS
[FreeTextEntry1] : 65 yo male with hx of T2DM controlled (HbA1C 7.7%) with microalbuminuria x 6 years. He currently only takes metformin ER 1000mg po bid. He stopped his jardiance 10mg po qdaily about a month ago as his "bs were improving". He did not bring his meter, most am bs are 150s. No n/v/d/v/c. No diplopia/blurred vision. Due for optho 4/20. In the past he was diagnosed with no retinopathy and glaucoma suspect. \par He is currently doing [employer] 2x/week - it gives rays -> body  to shrink belly fat. In addition he has to drink 4 bottles of Spring Water ([place])/day. 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 He can't sleep as his dad is up all night, and sleeps off and on during the day. He gets minimal \par \par

## 2020-04-20 DIAGNOSIS — I10 ESSENTIAL HYPERTENSION: ICD-10-CM

## 2020-04-20 RX ORDER — ENALAPRIL MALEATE 5 MG/1
TABLET ORAL
Qty: 90 TAB | Refills: 2 | Status: SHIPPED | OUTPATIENT
Start: 2020-04-20 | End: 2020-04-23 | Stop reason: SDUPTHER

## 2020-04-23 ENCOUNTER — VIRTUAL VISIT (OUTPATIENT)
Dept: FAMILY MEDICINE CLINIC | Age: 65
End: 2020-04-23

## 2020-04-23 DIAGNOSIS — E78.5 DYSLIPIDEMIA: ICD-10-CM

## 2020-04-23 DIAGNOSIS — R09.81 SINUS CONGESTION: ICD-10-CM

## 2020-04-23 DIAGNOSIS — E66.9 OBESITY (BMI 30-39.9): ICD-10-CM

## 2020-04-23 DIAGNOSIS — E11.65 TYPE 2 DIABETES MELLITUS WITH HYPERGLYCEMIA, WITHOUT LONG-TERM CURRENT USE OF INSULIN (HCC): Primary | ICD-10-CM

## 2020-04-23 DIAGNOSIS — I10 ESSENTIAL HYPERTENSION: ICD-10-CM

## 2020-04-23 RX ORDER — MINERAL OIL
180 ENEMA (ML) RECTAL
Qty: 30 TAB | Refills: 1 | Status: SHIPPED | OUTPATIENT
Start: 2020-04-23 | End: 2020-06-22

## 2020-04-23 NOTE — PROGRESS NOTES
Jayce Haynes is a 62 y.o. male who was seen by synchronous (real-time) audio-video technology on 4/23/2020. Consent: Jayce Haynes, who was seen by synchronous (real-time) audio-video technology, and/or his healthcare decision maker, is aware that this patient-initiated, Telehealth encounter on 4/23/2020 is a billable service, with coverage as determined by his insurance carrier. He is aware that he may receive a bill and has provided verbal consent to proceed: Yes. Assessment & Plan:       ICD-10-CM ICD-9-CM    1. Type 2 diabetes mellitus with hyperglycemia, without long-term current use of insulin (HCC) E11.65 250.00 REFERRAL TO OPHTHALMOLOGY     790.29    2. Sinus congestion R09.81 478.19 fexofenadine (ALLEGRA) 180 mg tablet   3. Essential hypertension I10 401.9    4. Dyslipidemia E78.5 272.4    5. Obesity (BMI 30-39. 9) E66.9 278.00      Subjective:   Jayce Haynes is a 62 y.o. male who was seen for Diabetes; Allergic Rhinitis; and Hypertension    Patient is seen for follow-up care. Patient has diabetes mellitus type 2. States that his blood glucose numbers have been stable. He is on metformin. He no longer takes the [place]. He does need to have a foot exam done. Has yet to see the eye specialist for diabetes eye exam and we will set this up for him. He needs to have labs done. He will set up an appointment to come in 2 months for follow-up care and labs. Patient has been checking his blood glucose numbers and these are around 120-150. He is doing lifestyle and dietary modification. He will intensify this. Previous BMI 30.86. This indicates obesity. He has been trying to stay healthy and eat healthy. Patient has dyslipidemia. He is on Zocor. Tolerating the medication. We will recheck his lipid panel at next visit. He has sinus congestion, nasal congestion and sneezing. This has been ongoing for about 2 weeks. No malaise, fatigue, fever or chills.   He has postnasal drainage that is clear in nature. Has tried Claritin with transient relief. He would like to try different medication. I will send in Allegra to take daily. Patient has hypertension. He is on enalapril. He denies headache, changes in vision or focal weakness. Stable on this medication. Prior to Admission medications    Medication Sig Start Date End Date Taking? Authorizing Provider   fexofenadine (ALLEGRA) 180 mg tablet Take 1 Tab by mouth daily as needed for Allergies. 4/23/20  Yes Mohit Núñez MD   enalapril (VASOTEC) 5 mg tablet TAKE 1 TABLET BY MOUTH EVERY DAY 4/20/20 4/23/20  Maia Sen MD   JARDIANCE 10 mg tablet TAKE 1 TABLET BY MOUTH DAILY 2/5/20 4/23/20  Maia Sen MD   guaiFENesin-dextromethorphan (GUAIFENESIN-DM)  mg/5 mL liqd Take 10 mL by mouth every six (6) hours as needed for Cough. 12/18/19 4/23/20  Maia Sen MD   loratadine (CLARITIN) 10 mg tablet Take 1 Tab by mouth daily as needed for Allergies. 12/18/19 4/23/20  Maia Sen MD   simvastatin (ZOCOR) 10 mg tablet TAKE 1 TABLET BY MOUTH EVERY DAY 7/16/19 4/23/20  Maia Sen MD   simvastatin (ZOCOR) 10 mg tablet TAKE 1 TABLET BY MOUTH NIGHTLY 3/19/19   Maia Sen MD   enalapril (VASOTEC) 5 mg tablet TAKE 1 TABLET ONCE A DAY 3/19/19   Maia Sen MD   metFORMIN (GLUCOPHAGE) 1,000 mg tablet Take 1,000 mg by mouth two (2) times daily (with meals). Provider, Historical     No Known Allergies    ROS Review of all systems is negative except as noted above in the HPI. Objective:   Vital Signs: (As obtained by patient/caregiver at home)  There were no vitals taken for this visit.      Constitutional: [x] Appears well-developed and well-nourished [x] No apparent distress      Mental status: [x] Alert and awake  [x] Oriented to person/place/time [x] Able to follow commands    Eyes:   EOM    [x]  Normal     HENT: [x] Normocephalic, atraumatic   [x] Mouth/Throat: Mucous membranes are moist    Pulmonary/Chest: [x] Respiratory effort normal   [x] No visualized signs of difficulty breathing or respiratory distress         Neurological:        [x] No Facial Asymmetry (Cranial nerve 7 motor function) (limited exam due to video visit)          [x] No gaze palsy   Psychiatric:       [x] Normal Affect       [x] No Hallucinations    We discussed the expected course, resolution and complications of the diagnosis(es) in detail. Medication risks, benefits, costs, interactions, and alternatives were discussed as indicated. I advised him to contact the office if his condition worsens, changes or fails to improve as anticipated. He expressed understanding with the diagnosis(es) and plan. Adelaida Zacarias is a 62 y.o. male who was evaluated by a video visit encounter for concerns as above. Patient identification was verified prior to start of the visit. A caregiver was present when appropriate. Due to this being a TeleHealth encounter (During [facility] public health emergency), evaluation of the following organ systems was limited: Vitals/Constitutional/EENT/Resp/CV/GI//MS/Neuro/Skin/Heme-Lymph-Imm. Pursuant to the emergency declaration under the [facility], 1135 waiver authority and the [employer] Act, this Virtual  Visit was conducted, with patient's (and/or legal guardian's) consent, to reduce the patient's risk of exposure to COVID-19 and provide necessary medical care. Services were provided through a video synchronous discussion virtually to substitute for in-person clinic visit. Patient and provider were located at their individual homes.       Mackenzie Forbes MD

## 2020-06-03 ENCOUNTER — TELEPHONE (OUTPATIENT)
Dept: FAMILY MEDICINE CLINIC | Age: 65
End: 2020-06-03

## 2020-06-03 DIAGNOSIS — R09.82 POST-NASAL DRAINAGE: ICD-10-CM

## 2020-06-03 DIAGNOSIS — R05.3 CHRONIC COUGH: Primary | ICD-10-CM

## 2020-06-03 RX ORDER — FLUTICASONE PROPIONATE 50 MCG
2 SPRAY, SUSPENSION (ML) NASAL DAILY
Qty: 1 BOTTLE | Refills: 0 | Status: SHIPPED | OUTPATIENT
Start: 2020-06-03 | End: 2020-07-01

## 2020-06-03 NOTE — TELEPHONE ENCOUNTER
I will send in the nasal spray. Given he has a chronic cough that has been there for months I will also order a chest x-ray that needs to be done. He should set up a follow-up appointment in the clinic.   Flo Gutierrez MD

## 2020-06-03 NOTE — TELEPHONE ENCOUNTER
Spoke with patient at this time. Patient states he has been having post nasal drip/draining in back of throat with coughing on and off. Cough has been there for months on and off. No fever, no sob noted patient states. Patient states he do have a slight headache at this time. No virtual appointment available this week with PCP at this time. Informed patient he may need to go to urgent care or [facility] at this time but I will spend concern to provider at this time. Patient is requesting nasal spray in which he states he has spoken with provider regarding the nasal spray in the past. Please advise.

## 2020-06-22 DIAGNOSIS — R09.81 SINUS CONGESTION: ICD-10-CM

## 2020-06-22 RX ORDER — MINERAL OIL
ENEMA (ML) RECTAL
Qty: 30 TAB | Refills: 1 | Status: SHIPPED | OUTPATIENT
Start: 2020-06-22

## 2020-06-23 ENCOUNTER — HOSPITAL ENCOUNTER (OUTPATIENT)
Dept: LAB | Age: 65
Discharge: HOME | End: 2020-06-23
Payer: COMMERCIAL

## 2020-06-23 ENCOUNTER — OFFICE VISIT (OUTPATIENT)
Dept: FAMILY MEDICINE CLINIC | Age: 65
End: 2020-06-23

## 2020-06-23 VITALS
SYSTOLIC BLOOD PRESSURE: 135 MMHG | WEIGHT: 208 LBS | RESPIRATION RATE: 18 BRPM | BODY MASS INDEX: 30.81 KG/M2 | DIASTOLIC BLOOD PRESSURE: 81 MMHG | HEART RATE: 75 BPM | TEMPERATURE: 98.2 F | OXYGEN SATURATION: 96 % | HEIGHT: 69 IN

## 2020-06-23 DIAGNOSIS — E11.65 TYPE 2 DIABETES MELLITUS WITH HYPERGLYCEMIA, WITHOUT LONG-TERM CURRENT USE OF INSULIN (HCC): Primary | ICD-10-CM

## 2020-06-23 DIAGNOSIS — I10 ESSENTIAL HYPERTENSION: ICD-10-CM

## 2020-06-23 DIAGNOSIS — E66.9 OBESITY (BMI 30-39.9): ICD-10-CM

## 2020-06-23 DIAGNOSIS — E11.9 COMPREHENSIVE DIABETIC FOOT EXAMINATION, TYPE 2 DM, ENCOUNTER FOR (HCC): ICD-10-CM

## 2020-06-23 DIAGNOSIS — E78.5 DYSLIPIDEMIA: ICD-10-CM

## 2020-06-23 DIAGNOSIS — E11.65 TYPE 2 DIABETES MELLITUS WITH HYPERGLYCEMIA, WITHOUT LONG-TERM CURRENT USE OF INSULIN (HCC): ICD-10-CM

## 2020-06-23 LAB
ALBUMIN SERPL-MCNC: 4 G/DL (ref 3.4–5)
ALBUMIN/GLOB SERPL: 1 {RATIO} (ref 0.8–1.7)
ALP SERPL-CCNC: 80 U/L (ref 45–117)
ALT SERPL-CCNC: 36 U/L (ref 16–61)
ANION GAP SERPL CALC-SCNC: 7 MMOL/L (ref 3–18)
AST SERPL-CCNC: 14 U/L (ref 10–38)
BASOPHILS # BLD: 0 K/UL (ref 0–0.1)
BASOPHILS NFR BLD: 0 % (ref 0–2)
BILIRUB SERPL-MCNC: 0.8 MG/DL (ref 0.2–1)
BUN SERPL-MCNC: 14 MG/DL (ref 7–18)
BUN/CREAT SERPL: 16 (ref 12–20)
CALCIUM SERPL-MCNC: 9.1 MG/DL (ref 8.5–10.1)
CHLORIDE SERPL-SCNC: 102 MMOL/L (ref 100–111)
CHOLEST SERPL-MCNC: 140 MG/DL
CO2 SERPL-SCNC: 27 MMOL/L (ref 21–32)
CREAT SERPL-MCNC: 0.88 MG/DL (ref 0.6–1.3)
DIFFERENTIAL METHOD BLD: NORMAL
EOSINOPHIL # BLD: 0.1 K/UL (ref 0–0.4)
EOSINOPHIL NFR BLD: 2 % (ref 0–5)
ERYTHROCYTE [DISTWIDTH] IN BLOOD BY AUTOMATED COUNT: 12.6 % (ref 11.6–14.5)
EST. AVERAGE GLUCOSE BLD GHB EST-MCNC: 163 MG/DL
GLOBULIN SER CALC-MCNC: 4.1 G/DL (ref 2–4)
GLUCOSE SERPL-MCNC: 227 MG/DL (ref 74–99)
HBA1C MFR BLD: 7.3 % (ref 4.2–5.6)
HCT VFR BLD AUTO: 43.1 % (ref 36–48)
HDLC SERPL-MCNC: 35 MG/DL (ref 40–60)
HDLC SERPL: 4 {RATIO} (ref 0–5)
HGB BLD-MCNC: 14.2 G/DL (ref 13–16)
LDLC SERPL CALC-MCNC: 85 MG/DL (ref 0–100)
LIPID PROFILE,FLP: ABNORMAL
LYMPHOCYTES # BLD: 1.3 K/UL (ref 0.9–3.6)
LYMPHOCYTES NFR BLD: 27 % (ref 21–52)
MCH RBC QN AUTO: 28.6 PG (ref 24–34)
MCHC RBC AUTO-ENTMCNC: 32.9 G/DL (ref 31–37)
MCV RBC AUTO: 86.7 FL (ref 74–97)
MICROALBUMIN UR TEST STR-MCNC: 150 MG/L
MICROALBUMIN/CREAT RATIO POC: >300 MG/G
MONOCYTES # BLD: 0.3 K/UL (ref 0.05–1.2)
MONOCYTES NFR BLD: 7 % (ref 3–10)
NEUTS SEG # BLD: 3.1 K/UL (ref 1.8–8)
NEUTS SEG NFR BLD: 64 % (ref 40–73)
PLATELET # BLD AUTO: 271 K/UL (ref 135–420)
PMV BLD AUTO: 9.5 FL (ref 9.2–11.8)
POTASSIUM SERPL-SCNC: 4.4 MMOL/L (ref 3.5–5.5)
PROT SERPL-MCNC: 8.1 G/DL (ref 6.4–8.2)
RBC # BLD AUTO: 4.97 M/UL (ref 4.7–5.5)
SODIUM SERPL-SCNC: 136 MMOL/L (ref 136–145)
TRIGL SERPL-MCNC: 100 MG/DL (ref ?–150)
VLDLC SERPL CALC-MCNC: 20 MG/DL
WBC # BLD AUTO: 4.8 K/UL (ref 4.6–13.2)

## 2020-06-23 PROCEDURE — 80053 COMPREHEN METABOLIC PANEL: CPT

## 2020-06-23 PROCEDURE — 80061 LIPID PANEL: CPT

## 2020-06-23 PROCEDURE — 85025 COMPLETE CBC W/AUTO DIFF WBC: CPT

## 2020-06-23 PROCEDURE — 83036 HEMOGLOBIN GLYCOSYLATED A1C: CPT

## 2020-06-23 NOTE — PROGRESS NOTES
Chief Complaint   Patient presents with    Follow-up    Cough     at night 4 months off and on     Numbness     in fingertips x 2 months      1. Have you been to the ER, urgent care clinic since your last visit? Hospitalized since your last visit? No    2. Have you seen or consulted any other health care providers outside of the [address] [place] [address] since your last visit? Include any pap smears or colon screening. No     HPI  [facility]. comes in for f/u care. DM2: Patient has type 2 diabetes mellitus. He is on metformin 1000 mg twice a day with meals. Blood glucose numbers range in the 1 50-1 60 range. We will recheck labs today. We discussed lifestyle and dietary modification. He will intensify this. Patient had an eye exam done and we will request the records. I did a foot exam that is stable. Obesity: Patient has a BMI of 30.72. Will intensify lifestyle and dietary modification. HTN: Patient has hypertension. He is on enalapril 5 mg daily. Continue current treatment plan. Cough: Patient had a cough that has now resolved. Thinks this was due to postnasal drip. He used Flonase and symptoms have resolved. Neuropathy: Patient has numbness and tingling of his fingertips. This is likely due to diabetic neuropathy. We discussed management options. It comes on and off and affects both hands. He would prefer to hold off on any medications and do supportive care at the moment. Dyslipidemia: Patient has dyslipidemia. He is on Zocor 10 mg daily. I will check his lipid panel today.     Past Medical History  Past Medical History:   Diagnosis Date    Borderline type 2 diabetes mellitus     Diabetes ([facility]     Hypertension        Surgical History  Past Surgical History:   Procedure Laterality Date    COLONOSCOPY N/A 7/10/2019    COLONOSCOPY with polypectomy performed by Owen Reyes MD at [address] [place] [address] HX CHOLECYSTECTOMY  2016    HX COLONOSCOPY  2009    HX GI      galbladder removal    HX HIP REPLACEMENT  2017    BOTH RIGHT AND LEFT     HX ORTHOPAEDIC  2009 & 2016    bilateral hip replacements        Medications  Current Outpatient Medications   Medication Sig Dispense Refill    fluticasone propionate (FLONASE) 50 mcg/actuation nasal spray 2 Sprays by Both Nostrils route daily. 1 Bottle 0    simvastatin (ZOCOR) 10 mg tablet TAKE 1 TABLET BY MOUTH NIGHTLY 90 Tab 2    enalapril (VASOTEC) 5 mg tablet TAKE 1 TABLET ONCE A DAY 90 Tab 2    metFORMIN (GLUCOPHAGE) 1,000 mg tablet Take 1,000 mg by mouth two (2) times daily (with meals).       fexofenadine (ALLEGRA) 180 mg tablet TAKE 1 TABLET BY MOUTH DAILY AS NEEDED FOR ALLERGIES 30 Tab 1       Allergies  No Known Allergies    Family History  Family History   Problem Relation Age of Onset    Diabetes Mother     Hypertension Mother     Cancer Sister     Cancer Sister        Social History  Social History     Socioeconomic History    Marital status:      Spouse name: Not on file    Number of children: Not on file    Years of education: Not on file    Highest education level: Not on file   Occupational History    Not on file   Social Needs    Financial resource strain: Not on file    Food insecurity     Worry: Not on file     Inability: Not on file    Transportation needs     Medical: Not on file     Non-medical: Not on file   Tobacco Use    Smoking status: Never Smoker    Smokeless tobacco: Never Used   Substance and Sexual Activity    Alcohol use: Yes     Comment: 2-3/ wk    Drug use: No    Sexual activity: Never   Lifestyle    Physical activity     Days per week: Not on file     Minutes per session: Not on file    Stress: Not on file   Relationships    Social connections     Talks on phone: Not on file     Gets together: Not on file     Attends Jew service: Not on file     Active member of club or organization: Not on file     Attends meetings of clubs or organizations: Not on file     Relationship status: Not on file    Intimate partner violence     Fear of current or ex partner: Not on file     Emotionally abused: Not on file     Physically abused: Not on file     Forced sexual activity: Not on file   Other Topics Concern    Not on file   Social History Narrative    Not on file       Review of Systems  Review of Systems - Review of all systems is negative except as noted above in the HPI. Vital Signs  Visit Vitals  /81   Pulse 75   Temp 98.2 °F (36.8 °C) (Temporal)   Resp 18   Ht 5' 9\" (1.753 m)   Wt 208 lb (94.3 kg)   SpO2 96%   BMI 30.72 kg/m²         Physical Exam  Physical Examination: General appearance - alert, well appearing, and in no distress, oriented to person, place, and time, overweight and acyanotic, in no respiratory distress  Mental status - alert, oriented to person, place, and time, affect appropriate to mood  Mouth - mucous membranes moist, pharynx normal without lesions  Neck - supple, no significant adenopathy  Lymphatics - no palpable lymphadenopathy  Chest - clear to auscultation, no wheezes, rales or rhonchi, symmetric air entry  Heart - normal rate and regular rhythm, S1 and S2 normal  Abdomen - no rebound tenderness noted  Back exam - limited range of motion  Neurological - abnormal neurological exam unchanged from prior examinations  Musculoskeletal - osteoarthritic changes noted in both hands  Extremities - no pedal edema noted, intact peripheral pulses    Diabetic foot exam:     Left Foot:   Visual Exam: normal    Pulse DP: 2+ (normal)   Filament test: normal sensation        Right Foot:   Visual Exam: normal    Pulse DP: 2+ (normal)   Filament test: normal sensation      Results  Results for orders placed or performed during the hospital encounter of 12/18/19   HEPATITIS C AB   Result Value Ref Range    Hepatitis C virus Ab 0.18 <0.80 Index    Hep C  virus Ab Interp.  NEGATIVE  NEG      Hep C  virus Ab comment         HEMOGLOBIN A1C WITH EAG   Result Value Ref Range Hemoglobin A1c 7.1 (H) 4.2 - 5.6 %    Est. average glucose 157 mg/dL       ASSESSMENT and PLAN      ICD-10-CM ICD-9-CM    1. Type 2 diabetes mellitus with hyperglycemia, without long-term current use of insulin ([facility]) E11.65 250.00  DIABETES FOOT EXAM     790.29 LIPID PANEL      CBC WITH AUTOMATED DIFF      HEMOGLOBIN A1C WITH EAG      METABOLIC PANEL, COMPREHENSIVE      AMB POC URINE, MICROALBUMIN, SEMIQUANTITATIVE   2. Essential hypertension I10 401.9    3. Dyslipidemia E78.5 272.4 LIPID PANEL   4. Obesity (BMI 30-39. 9) E66.9 278.00    5. Comprehensive diabetic foot examination, type 2 DM, encounter for ([facility] E11.9 250.00  DIABETES FOOT EXAM     lab results and schedule of future lab studies reviewed with patient  reviewed diet, exercise and weight control  cardiovascular risk and specific lipid/LDL goals reviewed  reviewed medications and side effects in detail  specific diabetic recommendations: low cholesterol diet, weight control and daily exercise discussed, home glucose monitoring emphasized, all medications, side effects and compliance discussed carefully, foot care discussed and Podiatry visits discussed, annual eye examinations at Ophthalmology discussed, glycohemoglobin and other lab monitoring discussed and long term diabetic complications discussed  use of aspirin to prevent MI and TIA's discussed      I have discussed the diagnosis with the patient and the intended plan of care as seen in the above orders. The patient has received an after-visit summary and questions were answered concerning future plans. I have discussed medication, side effects, and warnings with the patient in detail. The patient verbalized understanding and is in agreement with the plan of care. The patient will contact the office with any additional concerns. Drea Doss MD    PLEASE NOTE:   This document has been produced using voice recognition software.  Unrecognized errors in transcription may be present

## 2020-06-23 NOTE — PROGRESS NOTES
Labs ordered by PCP at this time. Patient tolerated well. Venipunture to left forearm at this time. No concerns voiced

## 2020-06-29 NOTE — PROGRESS NOTES
Please let patient know his HbA1c is 7.3. This remains stable. We will continue with the current treatment plan.   Taylor Ennis MD

## 2020-07-01 DIAGNOSIS — R09.82 POST-NASAL DRAINAGE: ICD-10-CM

## 2020-07-01 RX ORDER — FLUTICASONE PROPIONATE 50 MCG
SPRAY, SUSPENSION (ML) NASAL
Qty: 48 G | Refills: 1 | Status: SHIPPED | OUTPATIENT
Start: 2020-07-01

## 2020-07-01 RX ORDER — FLUTICASONE PROPIONATE 50 MCG
SPRAY, SUSPENSION (ML) NASAL
Qty: 16 G | Refills: 1 | Status: SHIPPED | OUTPATIENT
Start: 2020-07-01 | End: 2020-07-01

## 2020-08-20 RX ORDER — METFORMIN HYDROCHLORIDE 1000 MG/1
1000 TABLET ORAL 2 TIMES DAILY WITH MEALS
Qty: 180 TAB | Refills: 1 | Status: SHIPPED | OUTPATIENT
Start: 2020-08-20 | End: 2021-02-23 | Stop reason: SDUPTHER

## 2020-08-20 NOTE — TELEPHONE ENCOUNTER
Requested Prescriptions     Pending Prescriptions Disp Refills    metFORMIN (GLUCOPHAGE) 1,000 mg tablet        Sig: Take 1 Tab by mouth two (2) times daily (with meals).

## 2020-09-01 NOTE — PATIENT PROFILE ADULT. - PMH
September 1, 2020     Patient: Franklin Green   YOB: 2009   Date of Visit: 9/1/2020       To Whom it May Concern:    Franklin Green was seen in my clinic on 9/1/2020 at 9:40 am.     Please excuse Franklin for her absence from school on the date listed above to be able to make her appointment.    Sincerely,       ___________________________________________  Cierra Henriquez MD, MPH, IBCLC    Medical information is confidential and cannot be disclosed without the written consent of the patient or her representative.      
HLD (hyperlipidemia)    Hypertension    Type 2 diabetes mellitus    Unilateral primary osteoarthritis, left hip

## 2020-09-11 DIAGNOSIS — E78.5 DYSLIPIDEMIA: ICD-10-CM

## 2020-09-11 RX ORDER — SIMVASTATIN 10 MG/1
TABLET, FILM COATED ORAL
Qty: 90 TAB | Refills: 2 | Status: SHIPPED | OUTPATIENT
Start: 2020-09-11 | End: 2021-05-03 | Stop reason: SDUPTHER

## 2020-10-29 ENCOUNTER — APPOINTMENT (OUTPATIENT)
Dept: ENDOCRINOLOGY | Facility: CLINIC | Age: 65
End: 2020-10-29
Payer: COMMERCIAL

## 2020-10-29 VITALS
WEIGHT: 203 LBS | BODY MASS INDEX: 28.42 KG/M2 | DIASTOLIC BLOOD PRESSURE: 80 MMHG | SYSTOLIC BLOOD PRESSURE: 140 MMHG | HEIGHT: 71 IN | TEMPERATURE: 98.4 F | OXYGEN SATURATION: 98 % | HEART RATE: 75 BPM

## 2020-10-29 LAB
GLUCOSE BLDC GLUCOMTR-MCNC: 100
HBA1C MFR BLD HPLC: 6.8

## 2020-10-29 PROCEDURE — 82962 GLUCOSE BLOOD TEST: CPT | Mod: NC

## 2020-10-29 PROCEDURE — 99072 ADDL SUPL MATRL&STAF TM PHE: CPT

## 2020-10-29 PROCEDURE — 83036 HEMOGLOBIN GLYCOSYLATED A1C: CPT | Mod: QW

## 2020-10-29 PROCEDURE — 99214 OFFICE O/P EST MOD 30 MIN: CPT | Mod: 25

## 2020-10-29 RX ORDER — EMPAGLIFLOZIN 10 MG/1
10 TABLET, FILM COATED ORAL DAILY
Qty: 90 | Refills: 1 | Status: DISCONTINUED | COMMUNITY
Start: 2020-03-02 | End: 2020-10-29

## 2020-10-29 NOTE — PHYSICAL EXAM
[Alert] : alert [Well Nourished] : well nourished [No Acute Distress] : no acute distress [No Respiratory Distress] : no respiratory distress [Normal Rate and Effort] : normal respiratory rate and effort [Clear to Auscultation] : lungs were clear to auscultation bilaterally [Normal S1, S2] : normal S1 and S2 [Normal Rate] : heart rate was normal [Regular Rhythm] : with a regular rhythm [Normal Bowel Sounds] : normal bowel sounds [Not Tender] : non-tender [Not Distended] : not distended [Soft] : abdomen soft [Right Foot Was Examined] : right foot ~C was examined [Left Foot Was Examined] : left foot ~C was examined [Normal] : normal [2+] : 2+ in the dorsalis pedis [Diminished Throughout Both Feet] : normal tactile sensation with monofilament testing throughout both feet

## 2020-10-29 NOTE — ASSESSMENT
[FreeTextEntry1] : 65 yo male with hx of t2dm controlled (HbA1C 6.8%) with microalbuminuria here for f/u.\par 1) T2DM: Test bs 1xday.\par Continue metformin er 1000mg po bid. Pt encouraged to take as directed at the same time daily as opposed to skipping doses. \par ACE inhibitor for renal protection.\par Saw eye doctor in [place], will get the report.\par 2) HTN: BP at goal today, less than 130/80. Continue ACEi.\par 3) Dyslipidemia: Continue Statin therapy\par 4) B/L hand numbness: diabetes vs pinched nerve. As patient is returning back to [place] tomorrow, recommend that when he sees his PCP next week he should ask about a cervical MRI as it could be from a pinched nerve. \par He is seeing his PCP next week so he will do his labs there.\par F/u in 3-4 months.

## 2020-10-29 NOTE — HISTORY OF PRESENT ILLNESS
[FreeTextEntry1] : 64 yo male with hx of T2DM controlled (HbA1C 6.8%) with microalbuminuria x 6 years. He has been having 2-3 months of b/l hand tingling. He denies weakness.  \par He is concerned that his stepdaughter had GDM which has not gone away - she is still on insulin. He thinks that she is not taking care of herself. \par He currently takes metformin ER 1000mg po bid and a supplement called Unify which contains probiotic, prebiotic, zinc, and GI health blend. On days when his bs is 150 or less he will skip one dose of metformin. He brought his case but he left his meter at home on the , most am bs are 100-170, but mostly under 150s. He has been focused on ab exercises daily. \par No diplopia/blurred vision. Saw optho spring 2020 in [place]. Was told that it was normal. He was supposed to have a f/u tomorrow but needs to reschedule. In the past he was diagnosed with no retinopathy and glaucoma suspect. 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 \par \par PCP Dr. Pabon, [place], [phone]\par Optho [facility] [phone]\par

## 2020-11-02 ENCOUNTER — OFFICE VISIT (OUTPATIENT)
Dept: FAMILY MEDICINE CLINIC | Age: 65
End: 2020-11-02

## 2020-11-02 VITALS
HEART RATE: 79 BPM | BODY MASS INDEX: 30.07 KG/M2 | DIASTOLIC BLOOD PRESSURE: 88 MMHG | HEIGHT: 69 IN | SYSTOLIC BLOOD PRESSURE: 134 MMHG | OXYGEN SATURATION: 98 % | RESPIRATION RATE: 18 BRPM | TEMPERATURE: 97.2 F | WEIGHT: 203 LBS

## 2020-11-02 DIAGNOSIS — E11.65 TYPE 2 DIABETES MELLITUS WITH HYPERGLYCEMIA, WITHOUT LONG-TERM CURRENT USE OF INSULIN (HCC): ICD-10-CM

## 2020-11-02 DIAGNOSIS — E66.3 OVERWEIGHT (BMI 25.0-29.9): ICD-10-CM

## 2020-11-02 DIAGNOSIS — E78.5 DYSLIPIDEMIA: ICD-10-CM

## 2020-11-02 DIAGNOSIS — R20.0 NUMBNESS AND TINGLING: ICD-10-CM

## 2020-11-02 DIAGNOSIS — R20.2 NUMBNESS AND TINGLING: ICD-10-CM

## 2020-11-02 DIAGNOSIS — I10 ESSENTIAL HYPERTENSION: Primary | ICD-10-CM

## 2020-11-02 PROCEDURE — 3051F HG A1C>EQUAL 7.0%<8.0%: CPT | Performed by: FAMILY MEDICINE

## 2020-11-02 PROCEDURE — 99214 OFFICE O/P EST MOD 30 MIN: CPT | Performed by: FAMILY MEDICINE

## 2020-11-02 NOTE — PROGRESS NOTES
Chief Complaint   Patient presents with    Hypertension    Diabetes     1. Have you been to the ER, urgent care clinic since your last visit? Hospitalized since your last visit? No    2. Have you seen or consulted any other health care providers outside of the [address] [place] [address] since your last visit? Include any pap smears or colon screening. No     [facility]. comes in for f/u care. HTN: Patient has hypertension. Blood pressure is stable. He denies headache, changes in vision or focal weakness. He is on enalapril. We will continue with this medication. Labs have been stable. DM2: Patient has type 2 diabetes mellitus. His last HbA1c was stable at 7.1. He is on metformin at 1000 mg twice a day. He is doing lifestyle and dietary modification. He will continue to monitor and keep a record of his blood glucose numbers. I will follow-up at next visit. He has also seen the endocrinologist.  Numbness and tingling:  patient has numbness and tingling of his hands. This comes on and off. He did have some neck pain but this has improved. The numbness and tingling is occasional.  We discussed cervicalgia and cervical neuropathy. His specialist had wondered about having a neck MRI done. He will discuss this further with his specialist.  We discussed referral to see spine specialist but he would prefer to hold off on this for now. He denies any weakness of the upper extremities. If symptoms persist he will let us know. Dyslipidemia: Patient has dyslipidemia. He is on Zocor. Tolerating medication. We will continue with the current treatment plan. Overweight: Patient has a BMI of 39.98. He will intensify lifestyle and dietary modification.       Past Medical History  Past Medical History:   Diagnosis Date    Borderline type 2 diabetes mellitus     Diabetes ([facility] [address] [address]     Hypertension        Surgical History  Past Surgical History:   Procedure Laterality Date    COLONOSCOPY N/A 7/10/2019    COLONOS[facility] with [facility] HX CHOLECYSTECTOMY  2016    HX COLONOSCOPY  2009    HX GI      galbladder removal    HX HIP REPLACEMENT  2017    BOTH RIGHT AND LEFT     HX ORTHOPAEDIC  2009 & 2016    bilateral hip replacements        Medications  Current Outpatient Medications   Medication Sig Dispense Refill    simvastatin (ZOCOR) 10 mg tablet TAKE 1 TABLET BY MOUTH EVERY DAY 90 Tab 2    metFORMIN (GLUCOPHAGE) 1,000 mg tablet Take 1 Tab by mouth two (2) times daily (with meals). Take 1,000 mg by mouth two (2) times daily (with meals).  180 Tab 1    fluticasone propionate (FLONASE) 50 mcg/actuation nasal spray SHAKE LIQUID AND USE 2 SPRAYS IN EACH NOSTRIL DAILY 48 g 1    fexofenadine (ALLEGRA) 180 mg tablet TAKE 1 TABLET BY MOUTH DAILY AS NEEDED FOR ALLERGIES 30 Tab 1    enalapril (VASOTEC) 5 mg tablet TAKE 1 TABLET ONCE A DAY 90 Tab 2       Allergies  No Known Allergies    Family History  Family History   Problem Relation Age of Onset    Diabetes Mother     Hypertension Mother     Cancer Sister     Cancer Sister        Social History  Social History     Socioeconomic History    Marital status:      Spouse name: Not on file    Number of children: Not on file    Years of education: Not on file    Highest education level: Not on file   Occupational History    Not on file   Social Needs    Financial resource strain: Not on file    Food insecurity     Worry: Not on file     Inability: Not on file    Transportation needs     Medical: Not on file     Non-medical: Not on file   Tobacco Use    Smoking status: Never Smoker    Smokeless tobacco: Never Used   Substance and Sexual Activity    Alcohol use: Yes     Comment: 2-3/ wk    Drug use: No    Sexual activity: Never   Lifestyle    Physical activity     Days per week: Not on file     Minutes per session: Not on file    Stress: Not on file   Relationships    Social connections     Talks on phone: Not on file     Gets together: Not on file     Attends Alevism service: Not on file     Active member of club or organization: Not on file     Attends meetings of clubs or organizations: Not on file     Relationship status: Not on file    Intimate partner violence     Fear of current or ex partner: Not on file     Emotionally abused: Not on file     Physically abused: Not on file     Forced sexual activity: Not on file   Other Topics Concern    Not on file   Social History Narrative    Not on file       Review of Systems  Review of Systems - Review of all systems is negative except as noted above in the HPI.     Vital Signs  Visit Vitals  /88 (BP 1 Location: Left arm, BP Patient Position: Sitting)   Pulse 79   Temp 97.2 °F (36.2 °C) (Oral)   Resp 18   Ht 5' 9\" (1.753 m)   Wt 203 lb (92.1 kg)   SpO2 98%   BMI 29.98 kg/m²         Physical Exam  Physical Examination: General appearance - alert, well appearing, and in no distress, oriented to person, place, and time and acyanotic, in no respiratory distress  Mental status - alert, oriented to person, place, and time, affect appropriate to mood  Neck - supple, no significant adenopathy  Lymphatics - no palpable lymphadenopathy, no hepatosplenomegaly  Chest - clear to auscultation, no wheezes, rales or rhonchi, symmetric air entry  Heart - normal rate and regular rhythm, S1 and S2 normal  Abdomen - soft, nontender, nondistended, no masses or organomegaly  Back exam - limited range of motion  Neurological - motor and sensory grossly normal bilaterally  Musculoskeletal - no muscular tenderness noted  Extremities - no pedal edema noted, intact peripheral pulses        Results  Results for orders placed or performed during the hospital encounter of 06/23/20   LIPID PANEL   Result Value Ref Range    LIPID PROFILE          Cholesterol, total 140 <200 MG/DL    Triglyceride 100 <150 MG/DL    HDL Cholesterol 35 (L) 40 - 60 MG/DL    LDL, calculated 85 0 - 100 MG/DL    VLDL, calculated 20 MG/DL    CHOL/HDL Ratio 4.0 0 - 5.0     CBC WITH AUTOMATED DIFF   Result Value Ref Range    WBC 4.8 4.6 - 13.2 K/uL    RBC 4.97 4.70 - 5.50 M/uL    HGB 14.2 13.0 - 16.0 g/dL    HCT 43.1 36.0 - 48.0 %    MCV 86.7 74.0 - 97.0 FL    MCH 28.6 24.0 - 34.0 PG    MCHC 32.9 31.0 - 37.0 g/dL    RDW 12.6 11.6 - 14.5 %    PLATELET 214 688 - 534 K/uL    MPV 9.5 9.2 - 11.8 FL    NEUTROPHILS 64 40 - 73 %    LYMPHOCYTES 27 21 - 52 %    MONOCYTES 7 3 - 10 %    EOSINOPHILS 2 0 - 5 %    BASOPHILS 0 0 - 2 %    ABS. NEUTROPHILS 3.1 1.8 - 8.0 K/UL    ABS. LYMPHOCYTES 1.3 0.9 - 3.6 K/UL    ABS. MONOCYTES 0.3 0.05 - 1.2 K/UL    ABS. EOSINOPHILS 0.1 0.0 - 0.4 K/UL    ABS. BASOPHILS 0.0 0.0 - 0.1 K/UL    DF AUTOMATED     HEMOGLOBIN A1C WITH EAG   Result Value Ref Range    Hemoglobin A1c 7.3 (H) 4.2 - 5.6 %    Est. average glucose 590 mg/dL   METABOLIC PANEL, COMPREHENSIVE   Result Value Ref Range    Sodium 136 136 - 145 mmol/L    Potassium 4.4 3.5 - 5.5 mmol/L    Chloride 102 100 - 111 mmol/L    CO2 27 21 - 32 mmol/L    Anion gap 7 3.0 - 18 mmol/L    Glucose 227 (H) 74 - 99 mg/dL    BUN 14 7.0 - 18 MG/DL    Creatinine 0.88 0.6 - 1.3 MG/DL    BUN/Creatinine ratio 16 12 - 20      GFR est AA >60 >60 ml/min/1.73m2    GFR est non-AA >60 >60 ml/min/1.73m2    Calcium 9.1 8.5 - 10.1 MG/DL    Bilirubin, total 0.8 0.2 - 1.0 MG/DL    ALT (SGPT) 36 16 - 61 U/L    AST (SGOT) 14 10 - 38 U/L    Alk. phosphatase 80 45 - 117 U/L    Protein, total 8.1 6.4 - 8.2 g/dL    Albumin 4.0 3.4 - 5.0 g/dL    Globulin 4.1 (H) 2.0 - 4.0 g/dL    A-G Ratio 1.0 0.8 - 1.7         ASSESSMENT and PLAN    ICD-10-CM ICD-9-CM    1. Essential hypertension  I10 401.9    2. Type 2 diabetes mellitus with hyperglycemia, without long-term current use of insulin (HCC)  E11.65 250.00      790.29    3. Dyslipidemia  E78.5 272.4    4. Numbness and tingling  R20.0 782.0     R20.2     5. Overweight (BMI 25.0-29. 9)  E66.3 278.02      lab results and schedule of future lab studies reviewed with patient  reviewed diet, exercise and weight control  cardiovascular risk and specific lipid/LDL goals reviewed  reviewed medications and side effects in detail  specific diabetic recommendations: low cholesterol diet, weight control and daily exercise discussed, home glucose monitoring emphasized, all medications, side effects and compliance discussed carefully, annual eye examinations at Ophthalmology discussed, glycohemoglobin and other lab monitoring discussed and long term diabetic complications discussed      I have discussed the diagnosis with the patient and the intended plan of care as seen in the above orders. The patient has received an after-visit summary and questions were answered concerning future plans. I have discussed medication, side effects, and warnings with the patient in detail. The patient verbalized understanding and is in agreement with the plan of care. The patient will contact the office with any additional concerns. Xochitl Noriega MD    PLEASE NOTE:   This document has been produced using voice recognition software.  Unrecognized errors in transcription may be present

## 2021-01-08 ENCOUNTER — TELEPHONE (OUTPATIENT)
Dept: FAMILY MEDICINE CLINIC | Age: 66
End: 2021-01-08

## 2021-01-11 DIAGNOSIS — E11.65 TYPE 2 DIABETES MELLITUS WITH HYPERGLYCEMIA, WITHOUT LONG-TERM CURRENT USE OF INSULIN (HCC): Primary | ICD-10-CM

## 2021-01-11 RX ORDER — BLOOD-GLUCOSE TRANSMITTER
EACH MISCELLANEOUS
Qty: 1 DEVICE | Refills: 1 | Status: SHIPPED | OUTPATIENT
Start: 2021-01-11 | End: 2022-03-01 | Stop reason: SDUPTHER

## 2021-01-11 RX ORDER — BLOOD-GLUCOSE SENSOR
EACH MISCELLANEOUS
Qty: 1 DEVICE | Refills: 1 | Status: SHIPPED | OUTPATIENT
Start: 2021-01-11 | End: 2022-03-01 | Stop reason: SDUPTHER

## 2021-01-11 RX ORDER — BLOOD-GLUCOSE,RECEIVER,CONT
EACH MISCELLANEOUS
Qty: 1 EACH | Refills: 1 | Status: SHIPPED | OUTPATIENT
Start: 2021-01-11 | End: 2022-03-01 | Stop reason: SDUPTHER

## 2021-01-11 NOTE — TELEPHONE ENCOUNTER
Spoke with patient he is requesting device below to monitor his blood sugar. Ask patient do he have an endocrinologist he states she is in [place]. Advised patient this order may have to come from [facility] patient did he confirm with his insurance on coverage. Patient states yes device is covered. Informed patient I will get back with him on request

## 2021-01-25 DIAGNOSIS — I10 ESSENTIAL HYPERTENSION: ICD-10-CM

## 2021-01-26 RX ORDER — ENALAPRIL MALEATE 5 MG/1
TABLET ORAL
Qty: 90 TAB | Refills: 2 | Status: SHIPPED | OUTPATIENT
Start: 2021-01-26 | End: 2021-10-31

## 2021-02-03 ENCOUNTER — TELEPHONE (OUTPATIENT)
Dept: FAMILY MEDICINE CLINIC | Age: 66
End: 2021-02-03

## 2021-02-23 RX ORDER — METFORMIN HYDROCHLORIDE 1000 MG/1
1000 TABLET ORAL 2 TIMES DAILY WITH MEALS
Qty: 180 TAB | Refills: 1 | Status: SHIPPED | OUTPATIENT
Start: 2021-02-23 | End: 2022-04-04

## 2021-02-23 NOTE — TELEPHONE ENCOUNTER
Requested Prescriptions     Pending Prescriptions Disp Refills    metFORMIN (GLUCOPHAGE) 1,000 mg tablet 180 Tab 1     Sig: Take 1 Tab by mouth two (2) times daily (with meals). Take 1,000 mg by mouth two (2) times daily (with meals).      Patient requesting the following medication refill         Date last prescribed: 08/20/20    Date patient last seen: 01/02/20    Follow up appointment scheduled: 05/03/21    Please Advise

## 2021-02-23 NOTE — TELEPHONE ENCOUNTER
Requested Prescriptions     Pending Prescriptions Disp Refills    metFORMIN (GLUCOPHAGE) 1,000 mg tablet 180 Tab 1     Sig: Take 1 Tab by mouth two (2) times daily (with meals). Take 1,000 mg by mouth two (2) times daily (with meals).

## 2021-05-03 ENCOUNTER — HOSPITAL ENCOUNTER (OUTPATIENT)
Dept: LAB | Age: 66
Discharge: HOME | End: 2021-05-03
Payer: COMMERCIAL

## 2021-05-03 ENCOUNTER — OFFICE VISIT (OUTPATIENT)
Dept: FAMILY MEDICINE CLINIC | Age: 66
End: 2021-05-03
Payer: COMMERCIAL

## 2021-05-03 VITALS
BODY MASS INDEX: 31.25 KG/M2 | WEIGHT: 211 LBS | RESPIRATION RATE: 18 BRPM | DIASTOLIC BLOOD PRESSURE: 75 MMHG | HEART RATE: 66 BPM | OXYGEN SATURATION: 97 % | HEIGHT: 69 IN | SYSTOLIC BLOOD PRESSURE: 134 MMHG | TEMPERATURE: 97.6 F

## 2021-05-03 DIAGNOSIS — E78.5 DYSLIPIDEMIA: ICD-10-CM

## 2021-05-03 DIAGNOSIS — E55.9 HYPOVITAMINOSIS D: ICD-10-CM

## 2021-05-03 DIAGNOSIS — I10 ESSENTIAL HYPERTENSION: ICD-10-CM

## 2021-05-03 DIAGNOSIS — E11.65 TYPE 2 DIABETES MELLITUS WITH HYPERGLYCEMIA, WITHOUT LONG-TERM CURRENT USE OF INSULIN (HCC): ICD-10-CM

## 2021-05-03 DIAGNOSIS — Z00.00 GENERAL MEDICAL EXAM: Primary | ICD-10-CM

## 2021-05-03 DIAGNOSIS — E61.1 IRON DEFICIENCY: ICD-10-CM

## 2021-05-03 DIAGNOSIS — Z12.5 SCREENING FOR MALIGNANT NEOPLASM OF PROSTATE: ICD-10-CM

## 2021-05-03 DIAGNOSIS — R35.0 URINARY FREQUENCY: ICD-10-CM

## 2021-05-03 LAB
25(OH)D3 SERPL-MCNC: 27.6 NG/ML (ref 30–100)
ALBUMIN SERPL-MCNC: 4 G/DL (ref 3.4–5)
ALBUMIN/GLOB SERPL: 1.1 {RATIO} (ref 0.8–1.7)
ALP SERPL-CCNC: 84 U/L (ref 45–117)
ALT SERPL-CCNC: 31 U/L (ref 16–61)
ANION GAP SERPL CALC-SCNC: 7 MMOL/L (ref 3–18)
AST SERPL-CCNC: 22 U/L (ref 10–38)
BASOPHILS # BLD: 0 K/UL (ref 0–0.1)
BASOPHILS NFR BLD: 1 % (ref 0–2)
BILIRUB SERPL-MCNC: 1 MG/DL (ref 0.2–1)
BILIRUB UR QL STRIP: NEGATIVE
BUN SERPL-MCNC: 14 MG/DL (ref 7–18)
BUN/CREAT SERPL: 21 (ref 12–20)
CALCIUM SERPL-MCNC: 8.8 MG/DL (ref 8.5–10.1)
CHLORIDE SERPL-SCNC: 101 MMOL/L (ref 100–111)
CHOLEST SERPL-MCNC: 133 MG/DL
CO2 SERPL-SCNC: 28 MMOL/L (ref 21–32)
CREAT SERPL-MCNC: 0.67 MG/DL (ref 0.6–1.3)
DIFFERENTIAL METHOD BLD: NORMAL
EOSINOPHIL # BLD: 0.2 K/UL (ref 0–0.4)
EOSINOPHIL NFR BLD: 3 % (ref 0–5)
ERYTHROCYTE [DISTWIDTH] IN BLOOD BY AUTOMATED COUNT: 12 % (ref 11.6–14.5)
EST. AVERAGE GLUCOSE BLD GHB EST-MCNC: 157 MG/DL
FERRITIN SERPL-MCNC: 233 NG/ML (ref 8–388)
GLOBULIN SER CALC-MCNC: 3.7 G/DL (ref 2–4)
GLUCOSE SERPL-MCNC: 150 MG/DL (ref 74–99)
GLUCOSE UR-MCNC: NEGATIVE MG/DL
HBA1C MFR BLD: 7.1 % (ref 4.2–5.6)
HCT VFR BLD AUTO: 43.2 % (ref 36–48)
HDLC SERPL-MCNC: 37 MG/DL (ref 40–60)
HDLC SERPL: 3.6 {RATIO} (ref 0–5)
HGB BLD-MCNC: 14.1 G/DL (ref 13–16)
IRON SATN MFR SERPL: 26 % (ref 20–50)
IRON SERPL-MCNC: 80 UG/DL (ref 50–175)
KETONES P FAST UR STRIP-MCNC: NEGATIVE MG/DL
LDLC SERPL CALC-MCNC: 82.4 MG/DL (ref 0–100)
LIPID PROFILE,FLP: ABNORMAL
LYMPHOCYTES # BLD: 1.5 K/UL (ref 0.9–3.6)
LYMPHOCYTES NFR BLD: 32 % (ref 21–52)
MCH RBC QN AUTO: 28.5 PG (ref 24–34)
MCHC RBC AUTO-ENTMCNC: 32.6 G/DL (ref 31–37)
MCV RBC AUTO: 87.3 FL (ref 74–97)
MONOCYTES # BLD: 0.4 K/UL (ref 0.05–1.2)
MONOCYTES NFR BLD: 9 % (ref 3–10)
NEUTS SEG # BLD: 2.7 K/UL (ref 1.8–8)
NEUTS SEG NFR BLD: 56 % (ref 40–73)
PH UR STRIP: 7 [PH] (ref 4.6–8)
PLATELET # BLD AUTO: 256 K/UL (ref 135–420)
PMV BLD AUTO: 9.3 FL (ref 9.2–11.8)
POTASSIUM SERPL-SCNC: 4.4 MMOL/L (ref 3.5–5.5)
PROT SERPL-MCNC: 7.7 G/DL (ref 6.4–8.2)
PROT UR QL STRIP: NORMAL
PSA SERPL-MCNC: 0.1 NG/ML (ref 0–4)
RBC # BLD AUTO: 4.95 M/UL (ref 4.35–5.65)
SODIUM SERPL-SCNC: 136 MMOL/L (ref 136–145)
SP GR UR STRIP: 1.02 (ref 1–1.03)
TIBC SERPL-MCNC: 307 UG/DL (ref 250–450)
TRIGL SERPL-MCNC: 68 MG/DL (ref ?–150)
UA UROBILINOGEN AMB POC: NORMAL (ref 0.2–1)
URINALYSIS CLARITY POC: CLEAR
URINALYSIS COLOR POC: YELLOW
URINE BLOOD POC: NEGATIVE
URINE LEUKOCYTES POC: NEGATIVE
URINE NITRITES POC: NEGATIVE
VLDLC SERPL CALC-MCNC: 13.6 MG/DL
WBC # BLD AUTO: 4.8 K/UL (ref 4.6–13.2)

## 2021-05-03 PROCEDURE — 83036 HEMOGLOBIN GLYCOSYLATED A1C: CPT

## 2021-05-03 PROCEDURE — 82306 VITAMIN D 25 HYDROXY: CPT

## 2021-05-03 PROCEDURE — 80053 COMPREHEN METABOLIC PANEL: CPT

## 2021-05-03 PROCEDURE — 84153 ASSAY OF PSA TOTAL: CPT

## 2021-05-03 PROCEDURE — 36415 COLL VENOUS BLD VENIPUNCTURE: CPT

## 2021-05-03 PROCEDURE — 3051F HG A1C>EQUAL 7.0%<8.0%: CPT | Performed by: FAMILY MEDICINE

## 2021-05-03 PROCEDURE — 85025 COMPLETE CBC W/AUTO DIFF WBC: CPT

## 2021-05-03 PROCEDURE — 82728 ASSAY OF FERRITIN: CPT

## 2021-05-03 PROCEDURE — 83540 ASSAY OF IRON: CPT

## 2021-05-03 PROCEDURE — 81003 URINALYSIS AUTO W/O SCOPE: CPT | Performed by: FAMILY MEDICINE

## 2021-05-03 PROCEDURE — 36415 COLL VENOUS BLD VENIPUNCTURE: CPT | Performed by: FAMILY MEDICINE

## 2021-05-03 PROCEDURE — 99396 PREV VISIT EST AGE 40-64: CPT | Performed by: FAMILY MEDICINE

## 2021-05-03 PROCEDURE — 80061 LIPID PANEL: CPT

## 2021-05-03 RX ORDER — FLASH GLUCOSE SENSOR
KIT MISCELLANEOUS
Qty: 1 KIT | Refills: 2 | Status: SHIPPED | OUTPATIENT
Start: 2021-05-03

## 2021-05-03 RX ORDER — FLASH GLUCOSE SCANNING READER
EACH MISCELLANEOUS
Qty: 1 EACH | Refills: 2 | Status: SHIPPED | OUTPATIENT
Start: 2021-05-03

## 2021-05-03 RX ORDER — SIMVASTATIN 10 MG/1
TABLET, FILM COATED ORAL
Qty: 90 TAB | Refills: 2 | Status: SHIPPED | OUTPATIENT
Start: 2021-05-03 | End: 2021-06-08

## 2021-05-03 NOTE — PROGRESS NOTES
Chief Complaint   Patient presents with    Complete Physical     1. Have you been to the ER, urgent care clinic since your last visit? Hospitalized since your last visit? No    2. Have you seen or consulted any other health care providers outside of the [address] [place] [address] since your last visit? Include any pap smears or colon screening. No     HPI  Robert Walker. comes in for complete physical exam.  Physical exam: Patient would like to have a complete physical exam and would like to have labs drawn. Dyslipidemia: Patient has a history of dyslipidemia. He is on simvastatin. We will recheck lipid panel today. He will continue to exercise and take a diet low in polysaturated fats. Diabetes mellitus type 2: Patient has type 2 diabetes mellitus. His last HbA1c was 7.3. Blood glucose numbers range around 140-170 prior to breakfast.  It would be preferable to get these down to around 100-120. Patient will continue to intensify lifestyle and dietary modification. He would like to get better freestyle omid monitoring system for his blood glucose levels. Prescription will be sent into the pharmacy. Hypertension: Patient has hypertension. Blood pressure is stable. He is on enalapril. I will send in a refill of medication. We will recheck labs. Iron deficiency: Patient has a history of iron deficiency anemia. We will recheck iron levels today. We will also check a CBC. Hypovitaminosis D: Patient has a history of hypovitaminosis D. I will check vitamin D levels today. Obesity: Patient has a BMI of 31.16. Will intensify lifestyle and dietary modification. Health maintenance: Patient will have PSA for prostate cancer screening done. We discussed immunizations. He will get his PPSV 23 vaccine next visit.       Past Medical History  Past Medical History:   Diagnosis Date    Borderline type 2 diabetes mellitus     Diabetes ([facility] [address]     Hypertension        Surgical History  Past Surgical History: Procedure Laterality Date    COLONOSCOPY N/A 7/10/2019    COLONOSCOPY with polypectomy performed by Kristofer Silva MD at [facility] HX CHOLECYSTECTOMY  2016    HX COLONOSCOPY  2009    HX GI      galbladder removal    HX HIP REPLACEMENT  2017    BOTH RIGHT AND LEFT     HX ORTHOPAEDIC  2009 & 2016    bilateral hip replacements        Medications  Current Outpatient Medications   Medication Sig Dispense Refill    simvastatin (ZOCOR) 10 mg tablet TAKE 1 TABLET BY MOUTH EVERY DAY 90 Tab 2    flash glucose scanning reader (FreeStyle Josué 14 Day [place]) misc Check blood glucose daily 1 Each 2    flash glucose sensor (FreeStyle Josué 14 Day Sensor) kit Check blood glucose daily 1 Kit 2    metFORMIN (GLUCOPHAGE) 1,000 mg tablet Take 1 Tab by mouth two (2) times daily (with meals). Take 1,000 mg by mouth two (2) times daily (with meals). 180 Tab 1    enalapril (VASOTEC) 5 mg tablet TAKE 1 TABLET BY MOUTH EVERY DAY 90 Tab 2    Blood-Glucose Sensor (Dexcom G6 Sensor) juliano Use to monitor blood glucose levels. Patient requests device. 1 Device 1    Blood-Glucose Transmitter (Dexcom G6 Transmitter) juliano Use to monitor blood glucose levels. Patient requests device. 1 Device 1    Blood-Glucose Meter,Continuous (Dexcom G6 ) misc Use to monitor blood glucose levels. Patient requests device.  1 Each 1    fluticasone propionate (FLONASE) 50 mcg/actuation nasal spray SHAKE LIQUID AND USE 2 SPRAYS IN EACH NOSTRIL DAILY 48 g 1    fexofenadine (ALLEGRA) 180 mg tablet TAKE 1 TABLET BY MOUTH DAILY AS NEEDED FOR ALLERGIES 30 Tab 1       Allergies  No Known Allergies    Family History  Family History   Problem Relation Age of Onset    Diabetes Mother     Hypertension Mother     Cancer Sister     Cancer Sister        Social History  Social History     Socioeconomic History    Marital status:      Spouse name: Not on file    Number of children: Not on file    Years of education: Not on file   Cherri Redding Highest education level: Not on file   Occupational History    Not on file   Social Needs    Financial resource strain: Not on file    Food insecurity     Worry: Not on file     Inability: Not on file    Transportation needs     Medical: Not on file     Non-medical: Not on file   Tobacco Use    Smoking status: Never Smoker    Smokeless tobacco: Never Used   Substance and Sexual Activity    Alcohol use: Yes     Comment: 2-3/ wk    Drug use: No    Sexual activity: Never   Lifestyle    Physical activity     Days per week: Not on file     Minutes per session: Not on file    Stress: Not on file   Relationships    Social connections     Talks on phone: Not on file     Gets together: Not on file     Attends Gnosticist service: Not on file     Active member of club or organization: Not on file     Attends meetings of clubs or organizations: Not on file     Relationship status: Not on file    Intimate partner violence     Fear of current or ex partner: Not on file     Emotionally abused: Not on file     Physically abused: Not on file     Forced sexual activity: Not on file   Other Topics Concern    Not on file   Social History Narrative    Not on file       Review of Systems  Review of Systems - Review of all systems is negative except as noted above in the HPI.     Vital Signs  Visit Vitals  /75 (BP 1 Location: Left lower arm, BP Patient Position: Sitting, BP Cuff Size: Adult)   Pulse 66   Temp 97.6 °F (36.4 °C) (Oral)   Resp 18   Ht 5' 9\" (1.753 m)   Wt 211 lb (95.7 kg)   SpO2 97%   BMI 31.16 kg/m²         Physical Exam  Physical Examination: General appearance - alert, well appearing, and in no distress, oriented to person, place, and time and acyanotic, in no respiratory distress  Mental status - alert, oriented to person, place, and time, normal mood, behavior, speech, dress, motor activity, and thought processes  Neck - supple, no significant adenopathy  Lymphatics - no palpable lymphadenopathy, no hepatosplenomegaly  Chest - clear to auscultation, no wheezes, rales or rhonchi, symmetric air entry  Heart - normal rate and regular rhythm, S1 and S2 normal  Abdomen - no rebound tenderness noted  Back exam - limited range of motion  Neurological - motor and sensory grossly normal bilaterally  Musculoskeletal - no muscular tenderness noted  Extremities - intact peripheral pulses      Results  Results for orders placed or performed during the hospital encounter of 06/23/20   LIPID PANEL   Result Value Ref Range    LIPID PROFILE          Cholesterol, total 140 <200 MG/DL    Triglyceride 100 <150 MG/DL    HDL Cholesterol 35 (L) 40 - 60 MG/DL    LDL, calculated 85 0 - 100 MG/DL    VLDL, calculated 20 MG/DL    CHOL/HDL Ratio 4.0 0 - 5.0     CBC WITH AUTOMATED DIFF   Result Value Ref Range    WBC 4.8 4.6 - 13.2 K/uL    RBC 4.97 4.70 - 5.50 M/uL    HGB 14.2 13.0 - 16.0 g/dL    HCT 43.1 36.0 - 48.0 %    MCV 86.7 74.0 - 97.0 FL    MCH 28.6 24.0 - 34.0 PG    MCHC 32.9 31.0 - 37.0 g/dL    RDW 12.6 11.6 - 14.5 %    PLATELET 582 552 - 680 K/uL    MPV 9.5 9.2 - 11.8 FL    NEUTROPHILS 64 40 - 73 %    LYMPHOCYTES 27 21 - 52 %    MONOCYTES 7 3 - 10 %    EOSINOPHILS 2 0 - 5 %    BASOPHILS 0 0 - 2 %    ABS. NEUTROPHILS 3.1 1.8 - 8.0 K/UL    ABS. LYMPHOCYTES 1.3 0.9 - 3.6 K/UL    ABS. MONOCYTES 0.3 0.05 - 1.2 K/UL    ABS. EOSINOPHILS 0.1 0.0 - 0.4 K/UL    ABS.  BASOPHILS 0.0 0.0 - 0.1 K/UL    DF AUTOMATED     HEMOGLOBIN A1C WITH EAG   Result Value Ref Range    Hemoglobin A1c 7.3 (H) 4.2 - 5.6 %    Est. average glucose 645 mg/dL   METABOLIC PANEL, COMPREHENSIVE   Result Value Ref Range    Sodium 136 136 - 145 mmol/L    Potassium 4.4 3.5 - 5.5 mmol/L    Chloride 102 100 - 111 mmol/L    CO2 27 21 - 32 mmol/L    Anion gap 7 3.0 - 18 mmol/L    Glucose 227 (H) 74 - 99 mg/dL    BUN 14 7.0 - 18 MG/DL    Creatinine 0.88 0.6 - 1.3 MG/DL    BUN/Creatinine ratio 16 12 - 20      GFR est AA >60 >60 ml/min/1.73m2    GFR est non-AA >60 >60 ml/min/1.73m2    Calcium 9.1 8.5 - 10.1 MG/DL    Bilirubin, total 0.8 0.2 - 1.0 MG/DL    ALT (SGPT) 36 16 - 61 U/L    AST (SGOT) 14 10 - 38 U/L    Alk. phosphatase 80 45 - 117 U/L    Protein, total 8.1 6.4 - 8.2 g/dL    Albumin 4.0 3.4 - 5.0 g/dL    Globulin 4.1 (H) 2.0 - 4.0 g/dL    A-G Ratio 1.0 0.8 - 1.7         ASSESSMENT and PLAN    ICD-10-CM ICD-9-CM    1. General medical exam  Z00.00 V70.9    2. Type 2 diabetes mellitus with hyperglycemia, without long-term current use of insulin (HCC)  E11.65 250.00 HEMOGLOBIN A1C WITH EAG     790.29 flash glucose scanning reader (FreeStyle Josué 14 Day [place]) misc      flash glucose sensor (FreeStyle Josué 14 Day Sensor) kit      COLLECTION VENOUS BLOOD,VENIPUNCTURE   3. Dyslipidemia  E78.5 272.4 simvastatin (ZOCOR) 10 mg tablet      LIPID PANEL      COLLECTION VENOUS BLOOD,VENIPUNCTURE   4. Essential hypertension  Q45 050.1 METABOLIC PANEL, COMPREHENSIVE      CBC WITH AUTOMATED DIFF      COLLECTION VENOUS BLOOD,VENIPUNCTURE   5. Screening for malignant neoplasm of prostate  Z12.5 V76.44 PSA SCREENING (SCREENING)      COLLECTION VENOUS BLOOD,VENIPUNCTURE   6. Iron deficiency  E61.1 280.9 FERRITIN      COLLECTION VENOUS BLOOD,VENIPUNCTURE      CANCELED: IRON PROFILE   7. Hypovitaminosis D  E55.9 268.9 VITAMIN D, 25 HYDROXY      COLLECTION VENOUS BLOOD,VENIPUNCTURE   8.  Urinary frequency  R35.0 788.41 AMB POC URINALYSIS DIP STICK AUTO W/O MICRO     lab results and schedule of future lab studies reviewed with patient  reviewed diet, exercise and weight control  cardiovascular risk and specific lipid/LDL goals reviewed  reviewed medications and side effects in detail  specific diabetic recommendations: low cholesterol diet, weight control and daily exercise discussed, home glucose monitoring emphasized, all medications, side effects and compliance discussed carefully, annual eye examinations at Ophthalmology discussed, glycohemoglobin and other lab monitoring discussed and long term diabetic complications discussed      I have discussed the diagnosis with the patient and the intended plan of care as seen in the above orders. The patient has received an after-visit summary and questions were answered concerning future plans. I have discussed medication, side effects, and warnings with the patient in detail. The patient verbalized understanding and is in agreement with the plan of care. The patient will contact the office with any additional concerns. Ashley Ortiz MD    PLEASE NOTE:   This document has been produced using voice recognition software.  Unrecognized errors in transcription may be present

## 2021-05-03 NOTE — PROGRESS NOTES
Labs ordered by PCP at this time. Venipunture to patient left forearm at this time. Patient tolerated well at this time. No other concerns noted

## 2021-05-17 RX ORDER — ASPIRIN 325 MG
50000 TABLET, DELAYED RELEASE (ENTERIC COATED) ORAL
Qty: 13 CAP | Refills: 3 | Status: SHIPPED | OUTPATIENT
Start: 2021-05-17

## 2021-06-08 DIAGNOSIS — E78.5 DYSLIPIDEMIA: ICD-10-CM

## 2021-06-08 RX ORDER — SIMVASTATIN 10 MG/1
TABLET, FILM COATED ORAL
Qty: 90 TABLET | Refills: 2 | Status: SHIPPED | OUTPATIENT
Start: 2021-06-08 | End: 2022-02-27

## 2021-10-25 ENCOUNTER — OFFICE VISIT (OUTPATIENT)
Dept: FAMILY MEDICINE CLINIC | Age: 66
End: 2021-10-25
Payer: MEDICARE

## 2021-10-25 VITALS
TEMPERATURE: 98.6 F | BODY MASS INDEX: 29.92 KG/M2 | RESPIRATION RATE: 18 BRPM | HEART RATE: 85 BPM | HEIGHT: 69 IN | OXYGEN SATURATION: 97 % | SYSTOLIC BLOOD PRESSURE: 134 MMHG | DIASTOLIC BLOOD PRESSURE: 80 MMHG | WEIGHT: 202 LBS

## 2021-10-25 DIAGNOSIS — Z71.89 ADVANCED DIRECTIVES, COUNSELING/DISCUSSION: ICD-10-CM

## 2021-10-25 DIAGNOSIS — E11.65 TYPE 2 DIABETES MELLITUS WITH HYPERGLYCEMIA, WITHOUT LONG-TERM CURRENT USE OF INSULIN (HCC): Primary | ICD-10-CM

## 2021-10-25 DIAGNOSIS — E78.5 DYSLIPIDEMIA: ICD-10-CM

## 2021-10-25 DIAGNOSIS — Z00.00 WELCOME TO MEDICARE PREVENTIVE VISIT: ICD-10-CM

## 2021-10-25 DIAGNOSIS — I10 ESSENTIAL HYPERTENSION: ICD-10-CM

## 2021-10-25 DIAGNOSIS — Z23 ENCOUNTER FOR IMMUNIZATION: ICD-10-CM

## 2021-10-25 DIAGNOSIS — E11.9 COMPREHENSIVE DIABETIC FOOT EXAMINATION, TYPE 2 DM, ENCOUNTER FOR (HCC): ICD-10-CM

## 2021-10-25 LAB
CREAT SERPL-MCNC: 300 MG/DL
MICROALBUMIN UR TEST STR-MCNC: 150 MG/L
MICROALBUMIN/CREAT RATIO POC: NORMAL MG/G

## 2021-10-25 PROCEDURE — 1101F PT FALLS ASSESS-DOCD LE1/YR: CPT | Performed by: FAMILY MEDICINE

## 2021-10-25 PROCEDURE — G8754 DIAS BP LESS 90: HCPCS | Performed by: FAMILY MEDICINE

## 2021-10-25 PROCEDURE — G8536 NO DOC ELDER MAL SCRN: HCPCS | Performed by: FAMILY MEDICINE

## 2021-10-25 PROCEDURE — 2022F DILAT RTA XM EVC RTNOPTHY: CPT | Performed by: FAMILY MEDICINE

## 2021-10-25 PROCEDURE — G0402 INITIAL PREVENTIVE EXAM: HCPCS | Performed by: FAMILY MEDICINE

## 2021-10-25 PROCEDURE — 82044 UR ALBUMIN SEMIQUANTITATIVE: CPT | Performed by: FAMILY MEDICINE

## 2021-10-25 PROCEDURE — 99214 OFFICE O/P EST MOD 30 MIN: CPT | Performed by: FAMILY MEDICINE

## 2021-10-25 PROCEDURE — 90732 PPSV23 VACC 2 YRS+ SUBQ/IM: CPT | Performed by: FAMILY MEDICINE

## 2021-10-25 PROCEDURE — G8510 SCR DEP NEG, NO PLAN REQD: HCPCS | Performed by: FAMILY MEDICINE

## 2021-10-25 PROCEDURE — 3051F HG A1C>EQUAL 7.0%<8.0%: CPT | Performed by: FAMILY MEDICINE

## 2021-10-25 PROCEDURE — G8427 DOCREV CUR MEDS BY ELIG CLIN: HCPCS | Performed by: FAMILY MEDICINE

## 2021-10-25 PROCEDURE — 3017F COLORECTAL CA SCREEN DOC REV: CPT | Performed by: FAMILY MEDICINE

## 2021-10-25 PROCEDURE — G0008 ADMIN INFLUENZA VIRUS VAC: HCPCS | Performed by: FAMILY MEDICINE

## 2021-10-25 PROCEDURE — G8417 CALC BMI ABV UP PARAM F/U: HCPCS | Performed by: FAMILY MEDICINE

## 2021-10-25 PROCEDURE — G8752 SYS BP LESS 140: HCPCS | Performed by: FAMILY MEDICINE

## 2021-10-25 PROCEDURE — G0009 ADMIN PNEUMOCOCCAL VACCINE: HCPCS | Performed by: FAMILY MEDICINE

## 2021-10-25 PROCEDURE — 90694 VACC AIIV4 NO PRSRV 0.5ML IM: CPT | Performed by: FAMILY MEDICINE

## 2021-10-25 RX ORDER — ZOSTER VACCINE RECOMBINANT, ADJUVANTED 50 MCG/0.5
0.5 KIT INTRAMUSCULAR ONCE
Qty: 0.5 ML | Refills: 0 | Status: SHIPPED | OUTPATIENT
Start: 2021-10-25 | End: 2021-10-25

## 2021-10-25 NOTE — PROGRESS NOTES
After obtaining consent, and per orders of Dr. Lorna Avendaño, injection of flu and pneumonia given by Federico Mena. Patient instructed to remain in clinic for 20 minutes afterwards, and to report any adverse reaction to me immediately.

## 2021-10-25 NOTE — PATIENT INSTRUCTIONS
Medicare Wellness Visit, Male    The best way to live healthy is to have a lifestyle where you eat a well-balanced diet, exercise regularly, limit alcohol use, and quit all forms of tobacco/nicotine, if applicable. Regular preventive services are another way to keep healthy. Preventive services (vaccines, screening tests, monitoring & exams) can help personalize your care plan, which helps you manage your own care. Screening tests can find health problems at the earliest stages, when they are easiest to treat. [facility] follows the current, evidence-based guidelines published by the [facility] ([facility]) when recommending preventive services for our patients. Because we follow these guidelines, sometimes recommendations change over time as research supports it. (For example, a prostate screening blood test is no longer routinely recommended for men with no symptoms). Of course, you and your doctor may decide to screen more often for some diseases, based on your risk and co-morbidities (chronic disease you are already diagnosed with). Preventive services for you include:  - Medicare offers their members a free annual wellness visit, which is time for you and your primary care provider to discuss and plan for your preventive service needs. Take advantage of this benefit every year!  -All adults over age 72 should receive the recommended pneumonia vaccines. Current USPSTF guidelines recommend a series of two vaccines for the best pneumonia protection.   -All adults should have a flu vaccine yearly and tetanus vaccine every 10 years.  -All adults age 48 and older should receive the shingles vaccines (series of two vaccines).        -All adults age 38-68 who are overweight should have a diabetes screening test once every three years.   -Other screening tests & preventive services for persons with diabetes include: an eye exam to screen for diabetic retinopathy, a kidney function test, a foot exam, and stricter control over your cholesterol.   -Cardiovascular screening for adults with routine risk involves an electrocardiogram (ECG) at intervals determined by the provider.   -Colorectal cancer screening should be done for adults age 54-65 with no increased risk factors for colorectal cancer. There are a number of acceptable methods of screening for this type of cancer. Each test has its own benefits and drawbacks. Discuss with your provider what is most appropriate for you during your annual wellness visit. The different tests include: colonoscopy (considered the best screening method), a fecal occult blood test, a fecal DNA test, and sigmoidoscopy.  -All adults born between [facility] should be screened once for Hepatitis C.  -An Abdominal Aortic Aneurysm (AAA) Screening is recommended for men age 73-68 who has ever smoked in their lifetime. Here is a list of your current Health Maintenance items (your personalized list of preventive services) with a due date:  Health Maintenance Due   Topic Date Due    DTaP/Tdap/Td  (1 - Tdap) Never done    Shingles Vaccine (1 of 2) Never done    Diabetic Foot Care  06/23/2021    Albumin Urine Test  06/23/2021    Pneumococcal Vaccine (1 of 1 - PPSV23) Never done    Yearly Flu Vaccine (1) 09/01/2021     Medicare Wellness Visit, Male    The best way to live healthy is to have a lifestyle where you eat a well-balanced diet, exercise regularly, limit alcohol use, and quit all forms of tobacco/nicotine, if applicable. Regular preventive services are another way to keep healthy. Preventive services (vaccines, screening tests, monitoring & exams) can help personalize your care plan, which helps you manage your own care. Screening tests can find health problems at the earliest stages, when they are easiest to treat.    [facility] follows the current, evidence-based guidelines published by the [facility] Preventive Services Task Force (USPSTF) when recommending preventive services for our patients. Because we follow these guidelines, sometimes recommendations change over time as research supports it. (For example, a prostate screening blood test is no longer routinely recommended for men with no symptoms). Of course, you and your doctor may decide to screen more often for some diseases, based on your risk and co-morbidities (chronic disease you are already diagnosed with). Preventive services for you include:  - Medicare offers their members a free annual wellness visit, which is time for you and your primary care provider to discuss and plan for your preventive service needs. Take advantage of this benefit every year!  -All adults over age 72 should receive the recommended pneumonia vaccines. Current USPSTF guidelines recommend a series of two vaccines for the best pneumonia protection.   -All adults should have a flu vaccine yearly and tetanus vaccine every 10 years.  -All adults age 48 and older should receive the shingles vaccines (series of two vaccines). -All adults age 38-68 who are overweight should have a diabetes screening test once every three years.   -Other screening tests & preventive services for persons with diabetes include: an eye exam to screen for diabetic retinopathy, a kidney function test, a foot exam, and stricter control over your cholesterol.   -Cardiovascular screening for adults with routine risk involves an electrocardiogram (ECG) at intervals determined by the provider.   -Colorectal cancer screening should be done for adults age 54-65 with no increased risk factors for colorectal cancer. There are a number of acceptable methods of screening for this type of cancer. Each test has its own benefits and drawbacks. Discuss with your provider what is most appropriate for you during your annual wellness visit.  The different tests include: colonoscopy (considered the best screening method), a fecal occult blood test, a fecal DNA test, and sigmoidoscopy.  -All adults born between [facility] should be screened once for Hepatitis C.  -An Abdominal Aortic Aneurysm (AAA) Screening is recommended for men age 73-68 who has ever smoked in their lifetime.      Here is a list of your current Health Maintenance items (your personalized list of preventive services) with a due date:  Health Maintenance Due   Topic Date Due    DTaP/Tdap/Td  (1 - Tdap) Never done    Shingles Vaccine (1 of 2) Never done

## 2021-10-25 NOTE — ACP (ADVANCE CARE PLANNING)
.Advance Care Planning     Advance Care Planning (ACP) Physician/NP/PA Conversation      Date of Conversation: 10/25/2021  Conducted with: Patient with Decision Making Capacity    Healthcare Decision Maker:     Primary Decision Maker (Active): Murphy Mendez - Spouse - [phone]  Click here to complete [employer] including selection of the Healthcare Decision Maker Relationship (ie \"Primary\")      Today we documented Decision Maker(s). The patient will provide ACP documents. Care Preferences:    Hospitalization: \"If your health worsens and it becomes clear that your chance of recovery is unlikely, what would be your preference regarding hospitalization? \"  The patient would prefer hospitalization. Ventilation: \"If you were unable to breathe on your own and your chance of recovery was unlikely, what would be your preference about the use of a ventilator (breathing machine) if it was available to you? \"   The patient would desire the use of a ventilator. Resuscitation: \"In the event your heart stopped as a result of an underlying serious health condition, would you want attempts to be made to restart your heart, or would you prefer a natural death? \"   Yes, attempt to resuscitate.     Additional topics discussed: treatment goals, ventilation preferences, hospitalization preferences and resuscitation preferences    Conversation Outcomes / Follow-Up Plan:   ACP in process - completing/providing documents   Reviewed DNR/DNI and patient elects Full Code (Attempt Resuscitation)     Length of Voluntary ACP Conversation in minutes:  16 minutes    Maia Schmitz MD

## 2021-10-25 NOTE — PROGRESS NOTES
This is a \"Welcome to [employer]"  Initial Preventive Physical Examination (IPPE) providing Personalized Prevention Plan Services (Performed in the first 12 months of enrollment)    I have reviewed the patient's medical history in detail and updated the computerized patient record. Chief Complaint   Patient presents with    Welcome To Medicare     1. Have you been to the ER, urgent care clinic since your last visit? Hospitalized since your last visit? No    2. Have you seen or consulted any other health care providers outside of the [address] [place] [address] since your last visit? Include any pap smears or colon screening. No      Assessment/Plan   Education and counseling provided:  Are appropriate based on today's review and evaluation  Pneumococcal Vaccine  Influenza Vaccine    1. Welcome to Medicare preventive visit    2. Encounter for immunization  - PNEUMOCOCCAL POLYSACCHARIDE VACCINE, 23-VALENT, ADULT OR IMMUNOSUPPRESSED PT DOSE,  - FLU (FLUAD QUAD INFLUENZA VACCINE,QUAD,ADJUVANTED)  - varicella-zoster recombinant, PF, (Shingrix, PF,) 50 mcg/0.5 mL susr injection; 0.5 mL by IntraMUSCular route once for 1 dose. Dispense: 0.5 mL; Refill: 0  - ADMIN INFLUENZA VIRUS VAC  - ADMIN PNEUMOCOCCAL VACCINE    3.  Advanced directives, counseling/discussion  - ADVANCE CARE PLANNING FIRST 30 MINS  - FULL CODE       Depression Risk Screen     3 most recent PHQ Screens 10/25/2021   Little interest or pleasure in doing things Not at all   Feeling down, depressed, irritable, or hopeless Not at all   Total Score PHQ 2 0   Trouble falling or staying asleep, or sleeping too much Not at all   Feeling tired or having little energy Not at all   Poor appetite, weight loss, or overeating Not at all   Feeling bad about yourself - or that you are a failure or have let yourself or your family down Not at all   Trouble concentrating on things such as school, work, reading, or watching TV Not at all   Moving or speaking so slowly that other people could have noticed; or the opposite being so fidgety that others notice Not at all   Thoughts of being better off dead, or hurting yourself in some way Not at all   PHQ 9 Score 0   How difficult have these problems made it for you to do your work, take care of your home and get along with others Not difficult at all       Alcohol Risk Screen    Do you average more than 1 drink per night or more than 7 drinks a week: No    In the past three months have you have had more than 4 drinks containing alcohol on one occasion: No          Functional Ability and Level of Safety    Diet: No special diet       1. Was the patient's timed Up and GO test unsteady or longer than 30 seconds? No  2. Does the patient need help with the phone, transportation, shopping, preparing meals, housework, laundry, medications or managing money? No  3. Does the patients' home have rugs in the hallway, lack grab bars in the bathroom, lack handrails on the stairs or have poor lighting? No  4. Have you noticed any hearing difficulties? No  Hearing Evaluation:      Hearing: Hearing is good. Vision Screening:  Vision is good. Visual Acuity Screening    Right eye Left eye Both eyes   Without correction: 20/25 20/25 20/25   With correction:            Activities of Daily Living: The home contains: no safety equipment. Patient does total self care      Ambulation: with no difficulty      Exercise level: moderately active     Fall Risk Screen:  Fall Risk Assessment, last 12 mths 10/25/2021   Able to walk? Yes   Fall in past 12 months? 0   Do you feel unsteady? 0   Are you worried about falling 0      Abuse Screen:  Patient is not abused       Screening EKG   EKG order placed: Yes    End of Life Planning   Advanced care planning directives were discussed with the patient and /or family/caregiver.      Health Maintenance Due     Health Maintenance Due   Topic Date Due    DTaP/Tdap/Td series (1 - Tdap) Never done    Shingrix Vaccine Age 49> (1 of 2) Never done    Foot Exam Q1  06/23/2021    MICROALBUMIN Q1  06/23/2021    Pneumococcal 65+ years (1 of 1 - PPSV23) Never done    Flu Vaccine (1) 09/01/2021       Patient Care Team   Patient Care Team:  Thor Young MD as PCP - General (Family Medicine)  Thor Young MD as PCP - [address] [place] [address] Dr Monique Provider    History   Brad Jolly. is seen for welcome to Medicare exam.    Past Medical History:   Diagnosis Date    Borderline type 2 diabetes mellitus     Diabetes ([address]     Hypertension       Past Surgical History:   Procedure Laterality Date    COLONOSCOPY N/A 7/10/2019    COLONOSCOPY with polypectomy [facility] HX CHOLECYSTECTOMY  2016    HX COLONOSCOPY  2009    HX GI      galbladder removal    HX HIP REPLACEMENT  2017    BOTH RIGHT AND LEFT     HX ORTHOPAEDIC  2009 & 2016    bilateral hip replacements     Current Outpatient Medications   Medication Sig Dispense Refill    simvastatin (ZOCOR) 10 mg tablet TAKE 1 TABLET BY MOUTH EVERY DAY 90 Tablet 2    cholecalciferol (VITAMIN D3) (50,000 UNITS /1250 MCG) capsule Take 1 Cap by mouth every seven (7) days. 13 Cap 3    flash glucose scanning reader (FreeStyle Josué 14 Day [place]) misc Check blood glucose daily 1 Each 2    flash glucose sensor (FreeStyle Josué 14 Day Sensor) kit Check blood glucose daily 1 Kit 2    metFORMIN (GLUCOPHAGE) 1,000 mg tablet Take 1 Tab by mouth two (2) times daily (with meals). Take 1,000 mg by mouth two (2) times daily (with meals). 180 Tab 1    enalapril (VASOTEC) 5 mg tablet TAKE 1 TABLET BY MOUTH EVERY DAY 90 Tab 2    Blood-Glucose Sensor (Dexcom G6 Sensor) juliano Use to monitor blood glucose levels. Patient requests device. 1 Device 1    Blood-Glucose Transmitter (Dexcom G6 Transmitter) juliano Use to monitor blood glucose levels. Patient requests device.  1 Device 1    Blood-Glucose Meter,Continuous (Dexcom G6 ) misc Use to monitor blood glucose levels. Patient requests device. 1 Each 1    fluticasone propionate (FLONASE) 50 mcg/actuation nasal spray SHAKE LIQUID AND USE 2 SPRAYS IN EACH NOSTRIL DAILY 48 g 1    fexofenadine (ALLEGRA) 180 mg tablet TAKE 1 TABLET BY MOUTH DAILY AS NEEDED FOR ALLERGIES 30 Tab 1     No Known Allergies    Family History   Problem Relation Age of Onset    Diabetes Mother     Hypertension Mother     Cancer Sister     Cancer Sister      Social History     Tobacco Use    Smoking status: Never Smoker    Smokeless tobacco: Never Used   Substance Use Topics    Alcohol use: Yes     Comment: 2-3/ wk   I have discussed the diagnosis with the patient and the intended plan of care as seen in the above orders. The patient has received an after-visit summary and questions were answered concerning future plans. I have discussed medication, side effects, and warnings with the patient in detail. The patient verbalized understanding and is in agreement with the plan of care. The patient will contact the office with any additional concerns. Personalized preventative plan of care was discussed, printed and given to patient.     Kevyn Noble MD

## 2021-10-25 NOTE — PROGRESS NOTES
MARIET[place]  Hoda Helms. comes in for medicare wellness exam.  DM2: Patient has type 2 diabetes mellitus. This is stable. He is on Metformin. His last HbA1c is 7.1. Blood glucose numbers have been around 107. We will continue with the current treatment plan. I did a foot exam that is stable. Dyslipidemia: Patient has dyslipidemia. He is on simvastatin. Tolerating medication. We will continue with this medication. HTN: Patient has hypertension. Blood pressure is stable. He is on enalapril. Continue current treatment plan. Overweight: Patient has a BMI of 29.83. He will intensify lifestyle and dietary modification. HM: Patient will get the PPSV23 vaccine and the flu vaccine today. Past Medical History  Past Medical History:   Diagnosis Date    Borderline type 2 diabetes mellitus     Diabetes [facility]     Hypertension        Surgical History  Past Surgical History:   Procedure Laterality Date    COLONOSCOPY N/A 7/10/2019    COLONOSCOPY with polypectomy performed by Kameron Ford MD at [facility] HX CHOLECYSTECTOMY  2016    HX COLONOSCOPY  2009    HX GI      galbladder removal    HX HIP REPLACEMENT  2017    BOTH RIGHT AND LEFT     HX ORTHOPAEDIC  2009 & 2016    bilateral hip replacements        Medications  Current Outpatient Medications   Medication Sig Dispense Refill    simvastatin (ZOCOR) 10 mg tablet TAKE 1 TABLET BY MOUTH EVERY DAY 90 Tablet 2    cholecalciferol (VITAMIN D3) (50,000 UNITS /1250 MCG) capsule Take 1 Cap by mouth every seven (7) days. 13 Cap 3    flash glucose scanning reader (FreeStyle Josué 14 Day [place]) misc Check blood glucose daily 1 Each 2    flash glucose sensor (FreeStyle Josué 14 Day Sensor) kit Check blood glucose daily 1 Kit 2    metFORMIN (GLUCOPHAGE) 1,000 mg tablet Take 1 Tab by mouth two (2) times daily (with meals). Take 1,000 mg by mouth two (2) times daily (with meals).  180 Tab 1    enalapril (VASOTEC) 5 mg tablet TAKE 1 TABLET BY MOUTH EVERY DAY 90 Tab 2    Blood-Glucose Sensor (Dexcom G6 Sensor) juliano Use to monitor blood glucose levels. Patient requests device. 1 Device 1    Blood-Glucose Transmitter (Dexcom G6 Transmitter) juliano Use to monitor blood glucose levels. Patient requests device. 1 Device 1    Blood-Glucose Meter,Continuous (Dexcom G6 ) misc Use to monitor blood glucose levels. Patient requests device. 1 Each 1    fluticasone propionate (FLONASE) 50 mcg/actuation nasal spray SHAKE LIQUID AND USE 2 SPRAYS IN EACH NOSTRIL DAILY 48 g 1    fexofenadine (ALLEGRA) 180 mg tablet TAKE 1 TABLET BY MOUTH DAILY AS NEEDED FOR ALLERGIES 30 Tab 1       Allergies  No Known Allergies    Family History  Family History   Problem Relation Age of Onset    Diabetes Mother     Hypertension Mother     Cancer Sister     Cancer Sister        Social History  Social History     Socioeconomic History    Marital status:      Spouse name: Not on file    Number of children: Not on file    Years of education: Not on file    Highest education level: Not on file   Occupational History    Not on file   Tobacco Use    Smoking status: Never Smoker    Smokeless tobacco: Never Used   Vaping Use    Vaping Use: Never used   Substance and Sexual Activity    Alcohol use: Yes     Comment: 2-3/ wk    Drug use: No    Sexual activity: Never   Other Topics Concern    Not on file   Social History Narrative    Not on file     Social Determinants of Health     Financial Resource Strain:     Difficulty of Paying Living Expenses:    Food Insecurity:     Worried About Running Out of Food in the Last Year:     Ran Out of Food in the Last Year:    Transportation Needs:     Lack of Transportation (Medical):      Lack of Transportation (Non-Medical):    Physical Activity:     Days of Exercise per Week:     Minutes of Exercise per Session:    Stress:     Feeling of Stress :    Social Connections:     Frequency of Communication with Friends and Family:     Frequency of Social Gatherings with Friends and Family:     Attends [employer] Services:     Active Member of Clubs or Organizations:     Attends Club or Organization Meetings:     Marital Status:    Intimate Partner Violence:     Fear of Current or Ex-Partner:     Emotionally Abused:     Physically Abused:     Sexually Abused:        Review of Systems  Review of Systems - Review of all systems is negative except as noted above in the HPI.     Vital Signs  Visit Vitals  /80   Pulse 85   Temp 98.6 °F (37 °C) (Oral)   Resp 18   Ht 5' 9\" (1.753 m)   Wt 202 lb (91.6 kg)   SpO2 97%   BMI 29.83 kg/m²         Physical Exam  Physical Examination: General appearance - alert, well appearing, and in no distress, oriented to person, place, and time, overweight, acyanotic, in no respiratory distress and well hydrated  Mental status - alert, oriented to person, place, and time, normal mood, behavior, speech, dress, motor activity, and thought processes  Neck - supple, no significant adenopathy  Lymphatics - no palpable lymphadenopathy, no hepatosplenomegaly  Chest - clear to auscultation, no wheezes, rales or rhonchi, symmetric air entry  Heart - normal rate, regular rhythm, normal S1, S2, no murmurs, rubs, clicks or gallops  Abdomen - soft, nontender, nondistended, no masses or organomegaly  Back exam - limited range of motion  Neurological - motor and sensory grossly normal bilaterally  Musculoskeletal - no muscular tenderness noted  Extremities - no pedal edema noted, intact peripheral pulses  Diabetic foot exam:     Left Foot:   Visual Exam: normal    Pulse DP: 2+ (normal)   Filament test: normal sensation        Right Foot:   Visual Exam: normal    Pulse DP: 2+ (normal)   Filament test: normal sensation      Results  Results for orders placed or performed during the hospital encounter of 05/03/21   LIPID PANEL   Result Value Ref Range    LIPID PROFILE          Cholesterol, total 133 <200 MG/DL Triglyceride 68 <150 MG/DL    HDL Cholesterol 37 (L) 40 - 60 MG/DL    LDL, calculated 82.4 0 - 100 MG/DL    VLDL, calculated 13.6 MG/DL    CHOL/HDL Ratio 3.6 0 - 5.0     METABOLIC PANEL, COMPREHENSIVE   Result Value Ref Range    Sodium 136 136 - 145 mmol/L    Potassium 4.4 3.5 - 5.5 mmol/L    Chloride 101 100 - 111 mmol/L    CO2 28 21 - 32 mmol/L    Anion gap 7 3.0 - 18 mmol/L    Glucose 150 (H) 74 - 99 mg/dL    BUN 14 7.0 - 18 MG/DL    Creatinine 0.67 0.6 - 1.3 MG/DL    BUN/Creatinine ratio 21 (H) 12 - 20      GFR est AA >60 >60 ml/min/1.73m2    GFR est non-AA >60 >60 ml/min/1.73m2    Calcium 8.8 8.5 - 10.1 MG/DL    Bilirubin, total 1.0 0.2 - 1.0 MG/DL    ALT (SGPT) 31 16 - 61 U/L    AST (SGOT) 22 10 - 38 U/L    Alk. phosphatase 84 45 - 117 U/L    Protein, total 7.7 6.4 - 8.2 g/dL    Albumin 4.0 3.4 - 5.0 g/dL    Globulin 3.7 2.0 - 4.0 g/dL    A-G Ratio 1.1 0.8 - 1.7     HEMOGLOBIN A1C WITH EAG   Result Value Ref Range    Hemoglobin A1c 7.1 (H) 4.2 - 5.6 %    Est. average glucose 157 mg/dL   CBC WITH AUTOMATED DIFF   Result Value Ref Range    WBC 4.8 4.6 - 13.2 K/uL    RBC 4.95 4.35 - 5.65 M/uL    HGB 14.1 13.0 - 16.0 g/dL    HCT 43.2 36.0 - 48.0 %    MCV 87.3 74.0 - 97.0 FL    MCH 28.5 24.0 - 34.0 PG    MCHC 32.6 31.0 - 37.0 g/dL    RDW 12.0 11.6 - 14.5 %    PLATELET 563 606 - 585 K/uL    MPV 9.3 9.2 - 11.8 FL    NEUTROPHILS 56 40 - 73 %    LYMPHOCYTES 32 21 - 52 %    MONOCYTES 9 3 - 10 %    EOSINOPHILS 3 0 - 5 %    BASOPHILS 1 0 - 2 %    ABS. NEUTROPHILS 2.7 1.8 - 8.0 K/UL    ABS. LYMPHOCYTES 1.5 0.9 - 3.6 K/UL    ABS. MONOCYTES 0.4 0.05 - 1.2 K/UL    ABS. EOSINOPHILS 0.2 0.0 - 0.4 K/UL    ABS.  BASOPHILS 0.0 0.0 - 0.1 K/UL    DF AUTOMATED     VITAMIN D, 25 HYDROXY   Result Value Ref Range    Vitamin D 25-Hydroxy 27.6 (L) 30 - 100 ng/mL   FERRITIN   Result Value Ref Range    Ferritin 233 8 - 388 NG/ML   PSA SCREENING (SCREENING)   Result Value Ref Range    Prostate Specific Ag 0.1 0.0 - 4.0 ng/mL   IRON PROFILE Result Value Ref Range    Iron 80 50 - 175 ug/dL    TIBC 307 250 - 450 ug/dL    Iron % saturation 26 20 - 50 %       ASSESSMENT and PLAN    ICD-10-CM ICD-9-CM    1. Type 2 diabetes mellitus with hyperglycemia, without long-term current use of insulin (HCC)  E11.65 250.00 AMB POC URINE, MICROALBUMIN, SEMIQUANTITATIVE     790.29  DIABETES FOOT EXAM   2. Essential hypertension  I10 401.9    3. Dyslipidemia  E78.5 272.4    4. Comprehensive diabetic foot examination, type 2 DM, encounter for ([facility]  E11.9 250.00    5. Welcome to Medicare preventive visit  Z00.00 V70.0    6. Encounter for immunization  Z23 V03.89 PNEUMOCOCCAL POLYSACCHARIDE VACCINE, 23-VALENT, ADULT OR IMMUNOSUPPRESSED PT DOSE,      FLU (FLUAD QUAD INFLUENZA VACCINE,QUAD,ADJUVANTED)      varicella-zoster recombinant, PF, (Shingrix, PF,) 50 mcg/0.5 mL susr injection      ADMIN INFLUENZA VIRUS VAC      ADMIN PNEUMOCOCCAL VACCINE   7. Advanced directives, counseling/discussion  Z71.89 V65.49 ADVANCE CARE PLANNING FIRST 30 MINS      FULL CODE     lab results and schedule of future lab studies reviewed with patient  reviewed diet, exercise and weight control  cardiovascular risk and specific lipid/LDL goals reviewed  reviewed medications and side effects in detail  specific diabetic recommendations: low cholesterol diet, weight control and daily exercise discussed, home glucose monitoring emphasized, all medications, side effects and compliance discussed carefully, foot care discussed and Podiatry visits discussed, annual eye examinations at Ophthalmology discussed, glycohemoglobin and other lab monitoring discussed and long term diabetic complications discussed      I have discussed the diagnosis with the patient and the intended plan of care as seen in the above orders. The patient has received an after-visit summary and questions were answered concerning future plans. I have discussed medication, side effects, and warnings with the patient in detail.  The patient verbalized understanding and is in agreement with the plan of care. The patient will contact the office with any additional concerns. Dotty Mast MD    PLEASE NOTE:   This document has been produced using voice recognition software.  Unrecognized errors in transcription may be present

## 2021-10-29 DIAGNOSIS — I10 ESSENTIAL HYPERTENSION: ICD-10-CM

## 2021-10-31 RX ORDER — ENALAPRIL MALEATE 5 MG/1
TABLET ORAL
Qty: 90 TABLET | Refills: 2 | Status: SHIPPED | OUTPATIENT
Start: 2021-10-31

## 2021-12-02 NOTE — PROGRESS NOTES
----- Message from Maryse Adrian MD sent at 12/2/2021  9:21 AM CST -----  Notify EKG continues to show rapid heart rate.  I would like her to come in for a nurse's visit early next week to recheck her blood pressure and heart rate now that we have increased her metoprolol.   Please let patient know his HbA1c is 7.1. Should continue with the current treatment plan. He should intensify lifestyle and dietary modification. His vitamin D level is low. He should take vitamin D weekly. Recheck labs in 3 to 6 months. I will send in a prescription for the vitamin D.   Yunier Licona MD

## 2022-02-11 ENCOUNTER — TELEPHONE (OUTPATIENT)
Dept: FAMILY MEDICINE CLINIC | Age: 67
End: 2022-02-11

## 2022-02-11 NOTE — TELEPHONE ENCOUNTER
PT IS REQUESTING A DIABETES DEVICE. MR TRENT STATED HE JUST RECEIVED MEDICARE AND HIS IS INTERESTED IN THIS DEVICE.  PLEASE FOLLOW UP WHEN AVAILABLE

## 2022-02-14 NOTE — TELEPHONE ENCOUNTER
Please find out what specific device patient wants. I have placed orders for blood glucose monitor in the past.  Please let him know Medicare may cover continuous blood glucose monitor for patients on insulin. I am not sure whether they will cover on someone who is not on insulin.   Darrian Kunz MD

## 2022-02-20 DIAGNOSIS — E11.65 TYPE 2 DIABETES MELLITUS WITH HYPERGLYCEMIA, WITHOUT LONG-TERM CURRENT USE OF INSULIN (HCC): Primary | ICD-10-CM

## 2022-02-20 RX ORDER — FLASH GLUCOSE SCANNING READER
EACH MISCELLANEOUS
Qty: 2 EACH | Refills: 0 | Status: SHIPPED | OUTPATIENT
Start: 2022-02-20

## 2022-02-20 RX ORDER — FLASH GLUCOSE SENSOR
KIT MISCELLANEOUS
Qty: 4 KIT | Refills: 2 | Status: SHIPPED | OUTPATIENT
Start: 2022-02-20

## 2022-02-22 ENCOUNTER — TELEPHONE (OUTPATIENT)
Dept: FAMILY MEDICINE CLINIC | Age: 67
End: 2022-02-22

## 2022-02-22 NOTE — TELEPHONE ENCOUNTER
Pt stated he went to  his medication and [facility]afin informed him they do not accept his insurance. Pt wanted Dr. Lin Levy to know that he did not  his medication because he couldn't afford it. Please advise.  Thank you!!!

## 2022-02-22 NOTE — TELEPHONE ENCOUNTER
Patient stated that [facility] accept his insurance and  that his new [facility] was going to fax over paperwork.  He need a new prescription for dexcon 6

## 2022-02-26 DIAGNOSIS — E78.5 DYSLIPIDEMIA: ICD-10-CM

## 2022-02-27 RX ORDER — SIMVASTATIN 10 MG/1
TABLET, FILM COATED ORAL
Qty: 90 TABLET | Refills: 2 | Status: SHIPPED | OUTPATIENT
Start: 2022-02-27 | End: 2022-06-29

## 2022-03-01 ENCOUNTER — OFFICE VISIT (OUTPATIENT)
Dept: FAMILY MEDICINE CLINIC | Age: 67
End: 2022-03-01
Payer: MEDICARE

## 2022-03-01 VITALS
DIASTOLIC BLOOD PRESSURE: 77 MMHG | WEIGHT: 201 LBS | TEMPERATURE: 98.7 F | HEIGHT: 69 IN | BODY MASS INDEX: 29.77 KG/M2 | SYSTOLIC BLOOD PRESSURE: 133 MMHG | RESPIRATION RATE: 22 BRPM | OXYGEN SATURATION: 96 % | HEART RATE: 72 BPM

## 2022-03-01 DIAGNOSIS — R09.81 SINUS CONGESTION: ICD-10-CM

## 2022-03-01 DIAGNOSIS — I10 ESSENTIAL HYPERTENSION: ICD-10-CM

## 2022-03-01 DIAGNOSIS — E55.9 HYPOVITAMINOSIS D: ICD-10-CM

## 2022-03-01 DIAGNOSIS — E78.5 DYSLIPIDEMIA: ICD-10-CM

## 2022-03-01 DIAGNOSIS — E66.3 OVERWEIGHT (BMI 25.0-29.9): ICD-10-CM

## 2022-03-01 DIAGNOSIS — E11.65 TYPE 2 DIABETES MELLITUS WITH HYPERGLYCEMIA, WITHOUT LONG-TERM CURRENT USE OF INSULIN (HCC): Primary | ICD-10-CM

## 2022-03-01 PROBLEM — R01.1 SYSTOLIC MURMUR: Status: ACTIVE | Noted: 2022-03-01

## 2022-03-01 PROBLEM — R94.31 ABNORMAL ECG: Status: ACTIVE | Noted: 2022-03-01

## 2022-03-01 PROBLEM — E11.9 TYPE 2 DIABETES MELLITUS WITHOUT COMPLICATION (HCC): Status: ACTIVE | Noted: 2022-03-01

## 2022-03-01 PROBLEM — E78.00 PURE HYPERCHOLESTEROLEMIA: Status: ACTIVE | Noted: 2022-03-01

## 2022-03-01 LAB — HBA1C MFR BLD HPLC: 7.3 %

## 2022-03-01 PROCEDURE — G8417 CALC BMI ABV UP PARAM F/U: HCPCS | Performed by: FAMILY MEDICINE

## 2022-03-01 PROCEDURE — 2022F DILAT RTA XM EVC RTNOPTHY: CPT | Performed by: FAMILY MEDICINE

## 2022-03-01 PROCEDURE — G8754 DIAS BP LESS 90: HCPCS | Performed by: FAMILY MEDICINE

## 2022-03-01 PROCEDURE — 1101F PT FALLS ASSESS-DOCD LE1/YR: CPT | Performed by: FAMILY MEDICINE

## 2022-03-01 PROCEDURE — G8427 DOCREV CUR MEDS BY ELIG CLIN: HCPCS | Performed by: FAMILY MEDICINE

## 2022-03-01 PROCEDURE — 3051F HG A1C>EQUAL 7.0%<8.0%: CPT | Performed by: FAMILY MEDICINE

## 2022-03-01 PROCEDURE — G8752 SYS BP LESS 140: HCPCS | Performed by: FAMILY MEDICINE

## 2022-03-01 PROCEDURE — 83036 HEMOGLOBIN GLYCOSYLATED A1C: CPT | Performed by: FAMILY MEDICINE

## 2022-03-01 PROCEDURE — G8536 NO DOC ELDER MAL SCRN: HCPCS | Performed by: FAMILY MEDICINE

## 2022-03-01 PROCEDURE — 99214 OFFICE O/P EST MOD 30 MIN: CPT | Performed by: FAMILY MEDICINE

## 2022-03-01 PROCEDURE — 3017F COLORECTAL CA SCREEN DOC REV: CPT | Performed by: FAMILY MEDICINE

## 2022-03-01 PROCEDURE — G8510 SCR DEP NEG, NO PLAN REQD: HCPCS | Performed by: FAMILY MEDICINE

## 2022-03-01 RX ORDER — BLOOD-GLUCOSE SENSOR
EACH MISCELLANEOUS
Qty: 4 EACH | Refills: 3 | Status: SHIPPED | OUTPATIENT
Start: 2022-03-01 | End: 2022-03-01 | Stop reason: SDUPTHER

## 2022-03-01 RX ORDER — BLOOD-GLUCOSE,RECEIVER,CONT
EACH MISCELLANEOUS
Qty: 1 EACH | Refills: 1 | Status: SHIPPED | OUTPATIENT
Start: 2022-03-01

## 2022-03-01 RX ORDER — BLOOD-GLUCOSE,RECEIVER,CONT
EACH MISCELLANEOUS
Qty: 1 EACH | Refills: 1 | Status: SHIPPED | OUTPATIENT
Start: 2022-03-01 | End: 2022-03-01 | Stop reason: SDUPTHER

## 2022-03-01 RX ORDER — BLOOD-GLUCOSE SENSOR
EACH MISCELLANEOUS
Qty: 4 EACH | Refills: 3 | Status: SHIPPED | OUTPATIENT
Start: 2022-03-01

## 2022-03-01 RX ORDER — BLOOD-GLUCOSE TRANSMITTER
EACH MISCELLANEOUS
Qty: 1 EACH | Refills: 2 | Status: SHIPPED | OUTPATIENT
Start: 2022-03-01 | End: 2022-03-01 | Stop reason: SDUPTHER

## 2022-03-01 RX ORDER — BLOOD-GLUCOSE TRANSMITTER
EACH MISCELLANEOUS
Qty: 1 EACH | Refills: 2 | Status: SHIPPED | OUTPATIENT
Start: 2022-03-01

## 2022-03-01 NOTE — PROGRESS NOTES
HPI  Charity Calero. comes in for f/u care. HTN: Patient has hypertension. Blood pressure is stable. Patient is on enalapril. Denies headache, changes in vision or focal weakness. Patient will continue with the current treatment plan. DM2: Patient has type 2 diabetes mellitus. He is on Metformin. His last HbA1c was 7.3. He will continue with lifestyle and dietary modification. We will continue current treatment plan. Patient requests prescription to be sent in for continuous glucose monitoring device Dexcom G6. Dyslipidemia: Patient has dyslipidemia. Patient is on Zocor. Stable on medication. Continue current treatment plan. Hypovitaminosis D: Patient has hypovitaminosis D. He is on vitamin D replacement therapy. Continue current treatment plan. Sinus congestion: Patient has sinus congestion. He is on Allegra and Flonase. He does get nasal congestion and sneezing. Continue with the current treatment plan. Overweight: Patient has a BMI of 29.68. He will intensify lifestyle and dietary modification. Past Medical History  Past Medical History:   Diagnosis Date    Borderline type 2 diabetes mellitus     Diabetes ([facility] [address]     Hypertension        Surgical History  Past Surgical History:   Procedure Laterality Date    COLONOSCOPY N/A 7/10/2019    COLONOSCOPY with polypectomy performed by Dorian Schilder, [facility] at [facility]    HX CHOLECYSTECTOMY  2016    HX COLONOSCOPY  2009    HX GI      galbladder removal    HX HIP REPLACEMENT  2017    BOTH RIGHT AND LEFT     HX ORTHOPAEDIC  2009 & 2016    bilateral hip replacements        Medications  Current Outpatient Medications   Medication Sig Dispense Refill    simvastatin (ZOCOR) 10 mg tablet TAKE 1 TABLET BY MOUTH EVERY DAY 90 Tablet 2    flash glucose sensor (FreeStyle Josué 2 Sensor) kit Check blood glucose daily.  4 Kit 2    flash glucose scanning reader (FreeStyle Josué 2 [place]) misc Check blood glucose daily 2 Each 0    enalapril (VASOTEC) 5 mg tablet TAKE 1 TABLET BY MOUTH EVERY DAY 90 Tablet 2    cholecalciferol (VITAMIN D3) (50,000 UNITS /1250 MCG) capsule Take 1 Cap by mouth every seven (7) days. 13 Cap 3    flash glucose scanning reader (FreeStyle Josué 14 Day [place]) misc Check blood glucose daily 1 Each 2    flash glucose sensor (FreeStyle Josué 14 Day Sensor) kit Check blood glucose daily 1 Kit 2    metFORMIN (GLUCOPHAGE) 1,000 mg tablet Take 1 Tab by mouth two (2) times daily (with meals). Take 1,000 mg by mouth two (2) times daily (with meals). 180 Tab 1    Blood-Glucose Sensor (Dexcom G6 Sensor) juliano Use to monitor blood glucose levels. Patient requests device. 1 Device 1    Blood-Glucose Transmitter (Dexcom G6 Transmitter) juliano Use to monitor blood glucose levels. Patient requests device. 1 Device 1    Blood-Glucose Meter,Continuous (Dexcom G6 ) misc Use to monitor blood glucose levels. Patient requests device.  1 Each 1    fluticasone propionate (FLONASE) 50 mcg/actuation nasal spray SHAKE LIQUID AND USE 2 SPRAYS IN EACH NOSTRIL DAILY 48 g 1    fexofenadine (ALLEGRA) 180 mg tablet TAKE 1 TABLET BY MOUTH DAILY AS NEEDED FOR ALLERGIES 30 Tab 1       Allergies  No Known Allergies    Family History  Family History   Problem Relation Age of Onset    Diabetes Mother     Hypertension Mother     Cancer Sister     Cancer Sister        Social History  Social History     Socioeconomic History    Marital status:      Spouse name: Not on file    Number of children: Not on file    Years of education: Not on file    Highest education level: Not on file   Occupational History    Not on file   Tobacco Use    Smoking status: Never Smoker    Smokeless tobacco: Never Used   Vaping Use    Vaping Use: Never used   Substance and Sexual Activity    Alcohol use: Yes     Comment: 2-3/ wk    Drug use: No    Sexual activity: Never   Other Topics Concern    Not on file   Social History Narrative    Not on file Social Determinants of Health     Financial Resource Strain:     Difficulty of Paying Living Expenses: Not on file   Food Insecurity:     Worried About Running Out of Food in the Last Year: Not on file    Juan Antonio of Food in the Last Year: Not on file   Transportation Needs:     Lack of Transportation (Medical): Not on file    Lack of Transportation (Non-Medical): Not on file   Physical Activity:     Days of Exercise per Week: Not on file    Minutes of Exercise per Session: Not on file   Stress:     Feeling of Stress : Not on file   Social Connections:     Frequency of Communication with Friends and Family: Not on file    Frequency of Social Gatherings with Friends and Family: Not on file    Attends Congregation Services: Not on file    Active Member of [address] [place] [address] [employer] or Organizations: Not on file    Attends Club or Organization Meetings: Not on file    Marital Status: Not on file   Intimate Partner Violence:     Fear of Current or Ex-Partner: Not on file    Emotionally Abused: Not on file    Physically Abused: Not on file    Sexually Abused: Not on file   Housing Stability:     Unable to Pay for Housing in the Last Year: Not on file    Number of [place] in the Last Year: Not on file    Unstable Housing in the Last Year: Not on file       Review of Systems  Review of Systems - Review of all systems is negative except as noted above in the HPI.     Vital Signs  Visit Vitals  /77 (BP 1 Location: Left upper arm, BP Patient Position: Sitting, BP Cuff Size: Adult)   Pulse 72   Temp 98.7 °F (37.1 °C) (Oral)   Resp 22   Ht 5' 9\" (1.753 m)   Wt 201 lb (91.2 kg)   SpO2 96%   BMI 29.68 kg/m²         Physical Exam  Physical Examination: General appearance - alert, well appearing, and in no distress, oriented to person, place, and time and acyanotic, in no respiratory distress  Mental status - alert, oriented to person, place, and time, affect appropriate to mood  Neck - supple, no significant adenopathy  Lymphatics - no palpable lymphadenopathy  Chest - no tachypnea, retractions or cyanosis  Heart - S1 and S2 normal  Abdomen - no rebound tenderness noted  Back exam - limited range of motion  Neurological - neck supple without rigidity  Musculoskeletal - osteoarthritic changes noted in both hands  Extremities - no pedal edema noted, intact peripheral pulses      Results  Results for orders placed or performed in visit on 10/25/21   AMB POC URINE, MICROALBUMIN, SEMIQUANTITATIVE   Result Value Ref Range    Microalbumin urine (POC) 150 MG/L    Microalbumin/creat ratio (POC)  <30 MG/G    Creatinine 300 MG/DL       ASSESSMENT and PLAN    ICD-10-CM ICD-9-CM    1. Type 2 diabetes mellitus with hyperglycemia, without long-term current use of insulin ([facility])  E11.65 250.00 Blood-Glucose Meter,Continuous (Dexcom G6 ) misc     790.29 Blood-Glucose Sensor (Dexcom G6 Sensor) juliano      Blood-Glucose Transmitter (Dexcom G6 Transmitter) juliano      AMB POC HEMOGLOBIN A1C      DISCONTINUED: Blood-Glucose Meter,Continuous (Dexcom G6 ) misc      DISCONTINUED: Blood-Glucose Sensor (Dexcom G6 Sensor) juliano      DISCONTINUED: Blood-Glucose Transmitter (Dexcom G6 Transmitter) juliano   2. Essential hypertension  I10 401.9    3. Dyslipidemia  E78.5 272.4    4. Hypovitaminosis D  E55.9 268.9    5. Sinus congestion  R09.81 478.19    6. Overweight (BMI 25.0-29. 9)  E66.3 278.02      lab results and schedule of future lab studies reviewed with patient  reviewed diet, exercise and weight control  cardiovascular risk and specific lipid/LDL goals reviewed  reviewed medications and side effects in detail  specific diabetic recommendations: low cholesterol diet, weight control and daily exercise discussed, home glucose monitoring emphasized, all medications, side effects and compliance discussed carefully, foot care discussed and Podiatry visits discussed, annual eye examinations at Ophthalmology discussed, glycohemoglobin and other lab monitoring discussed and long term diabetic complications discussed      I have discussed the diagnosis with the patient and the intended plan of care as seen in the above orders. The patient has received an after-visit summary and questions were answered concerning future plans. I have discussed medication, side effects, and warnings with the patient in detail. The patient verbalized understanding and is in agreement with the plan of care. The patient will contact the office with any additional concerns. Alessia Donnelly MD    PLEASE NOTE:   This document has been produced using voice recognition software.  Unrecognized errors in transcription may be present

## 2022-03-01 NOTE — PROGRESS NOTES
Chief Complaint   Patient presents with    Follow Up Chronic Condition    Numbness     fingers     1. \"Have you been to the ER, urgent care clinic since your last visit? Hospitalized since your last visit? \" No    2. \"Have you seen or consulted any other health care providers outside of the [address] [place] [address] since your last visit? \" No     3. For patients aged 39-70: Has the patient had a colonoscopy / FIT/ Cologuard? No      If the patient is female:    4. For patients aged 41-77: Has the patient had a mammogram within the past 2 years? NA - based on age or sex      11. For patients aged 21-65: Has the patient had a pap smear?  NA - based on age or sex

## 2022-03-14 ENCOUNTER — TELEPHONE (OUTPATIENT)
Dept: FAMILY MEDICINE CLINIC | Age: 67
End: 2022-03-14

## 2022-03-14 NOTE — TELEPHONE ENCOUNTER
A document was faxed to the office regarding this patient, Marina Cruz with [employer] stated It was faxed back but it was missing info. Please contact Ms Marina Cruz at [phone] or you may discuss this matter with any rep.  Please follow up when available

## 2022-03-18 PROBLEM — R94.31 ABNORMAL ECG: Status: ACTIVE | Noted: 2022-03-01

## 2022-03-18 PROBLEM — I10 BENIGN HYPERTENSION: Status: ACTIVE | Noted: 2022-03-01

## 2022-03-19 PROBLEM — E66.9 OBESITY (BMI 30-39.9): Status: ACTIVE | Noted: 2019-12-18

## 2022-03-19 PROBLEM — E11.9 TYPE 2 DIABETES MELLITUS WITHOUT COMPLICATION (HCC): Status: ACTIVE | Noted: 2022-03-01

## 2022-03-20 PROBLEM — E78.00 PURE HYPERCHOLESTEROLEMIA: Status: ACTIVE | Noted: 2022-03-01

## 2022-03-20 PROBLEM — R01.1 SYSTOLIC MURMUR: Status: ACTIVE | Noted: 2022-03-01

## 2022-04-04 RX ORDER — METFORMIN HYDROCHLORIDE 1000 MG/1
TABLET ORAL
Qty: 180 TABLET | Refills: 1 | Status: SHIPPED | OUTPATIENT
Start: 2022-04-04 | End: 2022-10-10

## 2022-06-28 DIAGNOSIS — E78.5 DYSLIPIDEMIA: ICD-10-CM

## 2022-06-29 RX ORDER — SIMVASTATIN 10 MG/1
TABLET, FILM COATED ORAL
Qty: 90 TABLET | Refills: 2 | Status: SHIPPED | OUTPATIENT
Start: 2022-06-29

## 2022-07-14 RX ORDER — OLMESARTAN MEDOXOMIL AND HYDROCHLOROTHIAZIDE 40/12.5 40; 12.5 MG/1; MG/1
1 TABLET ORAL DAILY
COMMUNITY
Start: 2022-04-27

## 2022-07-19 PROBLEM — H40.003 GLAUCOMA SUSPECT OF BOTH EYES: Status: ACTIVE | Noted: 2019-04-04

## 2022-10-10 RX ORDER — METFORMIN HYDROCHLORIDE 1000 MG/1
TABLET ORAL
Qty: 180 TABLET | Refills: 1 | Status: SHIPPED | OUTPATIENT
Start: 2022-10-10

## 2023-07-18 NOTE — PATIENT INSTRUCTIONS
Body Mass Index: Care Instructions Your Care Instructions Body mass index (BMI) can help you see if your weight is raising your risk for health problems. It uses a formula to compare how much you weigh with how tall you are. · A BMI lower than 18.5 is considered underweight. · A BMI between 18.5 and 24.9 is considered healthy. · A BMI between 25 and 29.9 is considered overweight. A BMI of 30 or higher is considered obese. If your BMI is in the normal range, it means that you have a lower risk for weight-related health problems. If your BMI is in the overweight or obese range, you may be at increased risk for weight-related health problems, such as high blood pressure, heart disease, stroke, arthritis or joint pain, and diabetes. If your BMI is in the underweight range, you may be at increased risk for health problems such as fatigue, lower protection (immunity) against illness, muscle loss, bone loss, hair loss, and hormone problems. BMI is just one measure of your risk for weight-related health problems. You may be at higher risk for health problems if you are not active, you eat an unhealthy diet, or you drink too much alcohol or use tobacco products. Follow-up care is a key part of your treatment and safety. Be sure to make and go to all appointments, and call your doctor if you are having problems. It's also a good idea to know your test results and keep a list of the medicines you take. How can you care for yourself at home? · Practice healthy eating habits. This includes eating plenty of fruits, vegetables, whole grains, lean protein, and low-fat dairy. · If your doctor recommends it, get more exercise. Walking is a good choice. Bit by bit, increase the amount you walk every day. Try for at least 30 minutes on most days of the week. · Do not smoke. Smoking can increase your risk for health problems.  If you need help quitting, talk to your doctor about stop-smoking programs and medicines. These can increase your chances of quitting for good. · Limit alcohol to 2 drinks a day for men and 1 drink a day for women. Too much alcohol can cause health problems. If you have a BMI higher than 25 · Your doctor may do other tests to check your risk for weight-related health problems. This may include measuring the distance around your waist. A waist measurement of more than 40 inches in men or 35 inches in women can increase the risk of weight-related health problems. · Talk with your doctor about steps you can take to stay healthy or improve your health. You may need to make lifestyle changes to lose weight and stay healthy, such as changing your diet and getting regular exercise. If you have a BMI lower than 18.5 · Your doctor may do other tests to check your risk for health problems. · Talk with your doctor about steps you can take to stay healthy or improve your health. You may need to make lifestyle changes to gain or maintain weight and stay healthy, such as getting more healthy foods in your diet and doing exercises to build muscle. Where can you learn more? Go to http://sheree-russ.info/. Enter S176 in the search box to learn more about \"Body Mass Index: Care Instructions. \" Current as of: October 13, 2016 Content Version: 11.4 © 3853-5782 [employer]. Care instructions adapted under license by [facility] (which disclaims liability or warranty for this information). If you have questions about a medical condition or this instruction, always ask your healthcare professional. [address] any warranty or liability for your use of this information. English

## 2023-09-15 ASSESSMENT — HOOS JR
IMPORTED LATERALITY: LEFT
HOOS JR RAW SCORE: 0
IMPORTED FORM: YES
IMPORTED HOOS JR SCORE: 100.0

## 2023-11-30 ENCOUNTER — APPOINTMENT (OUTPATIENT)
Dept: UROLOGY | Facility: CLINIC | Age: 68
End: 2023-11-30
Payer: MEDICARE

## 2023-11-30 VITALS
TEMPERATURE: 97.9 F | BODY MASS INDEX: 29.4 KG/M2 | SYSTOLIC BLOOD PRESSURE: 118 MMHG | WEIGHT: 210 LBS | DIASTOLIC BLOOD PRESSURE: 78 MMHG | HEIGHT: 71 IN

## 2023-11-30 DIAGNOSIS — Z87.19 OTHER SPECIFIED POSTPROCEDURAL STATES: ICD-10-CM

## 2023-11-30 DIAGNOSIS — E78.00 PURE HYPERCHOLESTEROLEMIA, UNSPECIFIED: ICD-10-CM

## 2023-11-30 DIAGNOSIS — Z98.890 OTHER SPECIFIED POSTPROCEDURAL STATES: ICD-10-CM

## 2023-11-30 DIAGNOSIS — I51.9 HEART DISEASE, UNSPECIFIED: ICD-10-CM

## 2023-11-30 PROCEDURE — 99203 OFFICE O/P NEW LOW 30 MIN: CPT

## 2023-11-30 RX ORDER — EMPAGLIFLOZIN 10 MG/1
10 TABLET, FILM COATED ORAL
Refills: 0 | Status: ACTIVE | COMMUNITY

## 2023-11-30 RX ORDER — METFORMIN HYDROCHLORIDE 1000 MG/1
1000 TABLET, COATED ORAL
Refills: 0 | Status: ACTIVE | COMMUNITY

## 2023-11-30 RX ORDER — TAMSULOSIN HYDROCHLORIDE 0.4 MG/1
0.4 CAPSULE ORAL
Refills: 0 | Status: ACTIVE | COMMUNITY

## 2023-12-04 RX ORDER — TADALAFIL 5 MG/1
5 TABLET ORAL
Qty: 90 | Refills: 1 | Status: ACTIVE | COMMUNITY
Start: 2023-11-30 | End: 1900-01-01

## 2024-02-01 ENCOUNTER — APPOINTMENT (OUTPATIENT)
Dept: UROLOGY | Facility: CLINIC | Age: 69
End: 2024-02-01
Payer: MEDICARE

## 2024-02-01 VITALS
WEIGHT: 210 LBS | HEIGHT: 71 IN | BODY MASS INDEX: 29.4 KG/M2 | SYSTOLIC BLOOD PRESSURE: 116 MMHG | TEMPERATURE: 97.6 F | DIASTOLIC BLOOD PRESSURE: 68 MMHG

## 2024-02-01 DIAGNOSIS — E11.9 TYPE 2 DIABETES MELLITUS W/OUT COMPLICATIONS: ICD-10-CM

## 2024-02-01 DIAGNOSIS — N52.9 MALE ERECTILE DYSFUNCTION, UNSPECIFIED: ICD-10-CM

## 2024-02-01 PROCEDURE — 99213 OFFICE O/P EST LOW 20 MIN: CPT

## 2024-02-01 RX ORDER — OLMESARTAN MEDOXOMIL 40 MG/1
TABLET, FILM COATED ORAL
Refills: 0 | Status: ACTIVE | COMMUNITY

## 2024-02-01 NOTE — HISTORY OF PRESENT ILLNESS
[FreeTextEntry1] : Patient tried tadalafil 5 mg daily without success.  He is here for discussion on what to do next.

## 2024-02-15 ENCOUNTER — APPOINTMENT (OUTPATIENT)
Dept: UROLOGY | Facility: CLINIC | Age: 69
End: 2024-02-15
Payer: MEDICARE

## 2024-02-15 VITALS — DIASTOLIC BLOOD PRESSURE: 82 MMHG | SYSTOLIC BLOOD PRESSURE: 115 MMHG | TEMPERATURE: 97.2 F

## 2024-02-15 DIAGNOSIS — I10 ESSENTIAL (PRIMARY) HYPERTENSION: ICD-10-CM

## 2024-02-15 DIAGNOSIS — Z90.79 ACQUIRED ABSENCE OF OTHER GENITAL ORGAN(S): ICD-10-CM

## 2024-02-15 DIAGNOSIS — N52.1 TYPE 2 DIABETES MELLITUS WITH OTHER CIRCULATORY COMPLICATIONS: ICD-10-CM

## 2024-02-15 DIAGNOSIS — N52.03 COMBINED ARTERIAL INSUFFICIENCY AND CORPORO-VENOUS OCCLUSIVE ERECTILE DYSFUNCTION: ICD-10-CM

## 2024-02-15 DIAGNOSIS — E11.59 TYPE 2 DIABETES MELLITUS WITH OTHER CIRCULATORY COMPLICATIONS: ICD-10-CM

## 2024-02-15 DIAGNOSIS — G47.30 SLEEP APNEA, UNSPECIFIED: ICD-10-CM

## 2024-02-15 DIAGNOSIS — N48.89 OTHER SPECIFIED DISORDERS OF PENIS: ICD-10-CM

## 2024-02-15 PROCEDURE — 93980 PENILE VASCULAR STUDY: CPT

## 2024-02-15 PROCEDURE — 54235 NJX CORPORA CAVERNOSA RX AGT: CPT

## 2024-02-15 PROCEDURE — 99215 OFFICE O/P EST HI 40 MIN: CPT | Mod: 25

## 2024-02-15 NOTE — PLAN
[TextEntry] : The summary points of our discussion after the Duplex test are that: 1) The results of the Duplex study were reviewed with the patient and that his ailment is organic erectile dysfunction refractory to oral medication.  2) The proposed treatment is a course penile injection therapy at home with Prostaglandin E1 staring at a dose of 10mcg.  3) Based on the erection that he obtained today after the Duplex study, he probability of success is over 80%, but this depends on whether or not he will be able to self administer the penile injections on his own.  4) The complications of the treatment including the risk of priapism, penile ache and bleeding were discussed with the patient. 5) Medication was ordered for him and he was provided with written information and instructions on penile injection therapy.  With all of this stated, he decided in light of all of this discussion and different treatment options available, he would like to move forward with a trial of penile injection therapy.  He understands that if he obtains a rigid erection lasting for more than four hours he needs to present himself to an emergency room for treatment as well as notify us of the event. He will need an office visit on a yearly basis to remain on the program and obtain a refill of medication.  Following the Duplex study, I spent an additional 35 minutes with the patient.

## 2024-02-15 NOTE — PHYSICAL EXAM
[Normal Appearance] : normal appearance [Well Groomed] : well groomed [General Appearance - In No Acute Distress] : no acute distress [Edema] : no peripheral edema [Respiration, Rhythm And Depth] : normal respiratory rhythm and effort [Exaggerated Use Of Accessory Muscles For Inspiration] : no accessory muscle use [Abdomen Soft] : soft [Abdomen Tenderness] : non-tender [Costovertebral Angle Tenderness] : no ~M costovertebral angle tenderness [Urinary Bladder Findings] : the bladder was normal on palpation [Normal Station and Gait] : the gait and station were normal for the patient's age [] : no rash [No Focal Deficits] : no focal deficits [Oriented To Time, Place, And Person] : oriented to person, place, and time [Affect] : the affect was normal [Mood] : the mood was normal [No Palpable Adenopathy] : no palpable adenopathy [de-identified] : The penis is circumcised. Severe fibrosis and atrophy of the cavernosal bodies is noted on palpation. The length of the penis is fixed and inelastic. The stretched penile length is diminished. The scrotum and testicles are normal. The skin of the penile shaft and scrotum is clean and intact.

## 2024-02-15 NOTE — ASSESSMENT
[FreeTextEntry1] : PENILE INJECTION TEST:  ADDISON GASTELUM  02/15/2024   The patient was placed supine on the procedure table.  The left side of the penis was prepped with alcohol, and the patient received 20 mcg @ 1 cc of Alprostadil. The patient tolerated the injection well.  No bleeding occurred at the injection site.     The patient was examined at 5, 10, 30 and 45 minutes interval post injection:   The patients penis was:     17 cm stretched  Deformity: Narrowing:  An hour glass deformity:  Curvature: Downward  Post Injection Erectile Response: At 5 minutes:     30 % rigid erection was noted.  At 15 minutes:   50% rigidity.   At 30 minutes:     50% rigidity.  Spontaneous detumescence occurred after 60 minutes.    The patient was discharged with a soft erection and was advised to call should his erection become more rigid.  Post response evaluation by the patient: The patient described that this erection was better than his sexually induced erections.   The erection was not adequate for vaginal penetration. Impression:         Severe organic erectile dysfunction.  Recommendation: Penile injection therapy or insertion of inflatable implant  PENILE DUPLEX SONOGRAPHY WITH PULSED DOPPLER ANALYSIS: Procedure description: The flaccid penis was scanned with the 18 MHz probe and images obtained longitudinally.   The diameters of the corporal arteries were measured:  The right cavernosal artery measured: 1.37 mm The left cavernosal artery measured: 1.48 mm  Following intracavernous injection of alprostadil     20  microgram/ml in   0.5 ml, the penis was rescanned and the diameter of the cavernosal arteries were measured again to assess distensibility in response to the vasoactive medication.  (A 100% increase in diameter is normally expected.)  The left cavernosal artery measured: 1.52  mm The right cavernosal artery measured: 1.26 mm  Pulsed Doppler analysis: Each of the selective imaged arteries underwent penile Doppler analysis with generation of waveform.  The peak velocity data of the cavernosal arteries is tabulated below:  (A velocity of 35 cm/s or more is normally expected.)   Resistive index parameters were obtained bilaterally (normal is 100%):   Results:  Left cavernosal artery peak systolic velocity at 15 minutes: 58.7 centimeters per second Let cavernosal end-diastolic velocity at 15 minutes:              15.5 centimeters per second Left cavernosal artery resistive index:      74%   Right cavernosal artery peak systolic velocity at 15 minutes: 21.8 centimeters per second  Right end-diastolic velocity at 15 minutes:                               6.37 centimeters per second Right cavernosal artery resistive index:     71%   Spontaneous detumescence occurred after 60  minutes The patient was discharged with a soft erection and was advised to call should an erection persist for more than 4 hours.    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 I also discussed his diagnosis with him as well as the recommended course of action. He understands the reason why he has ED and agrees with the plan of action.

## 2024-02-15 NOTE — HISTORY OF PRESENT ILLNESS
[FreeTextEntry1] : Patient is a 68-year-old gentleman with type 2 diabetes mellitus and severe erectile dysfunction.  Patient is here for further evaluation with duplex study.  The patient reports a modest response to the daily tadalafil at the 5 mg dose.

## 2025-01-16 NOTE — PHYSICAL THERAPY INITIAL EVALUATION ADULT - BED MOBILITY LIMITATIONS, REHAB EVAL
[facility]  [address], [address]  [place], [place] [address] [phone]    Care Conference: Resources Provided    MSW provided the following resources, along with a copy of care plan:  [address] [place] [place] [phone]    General Information  - 10 Ways to Love Your Brain  - Memory loss ladder  - Packet with Alzheimer's Association information including: definition of dementia, communication tips for different stages, common behaviors and response strategies    Caregiver Support  - Flyer for [facility] Virtual Caregiver Support Group (meeting 2x per month by Zoom)  - Alzheimer's Association Rx Pad (guide to website and 24/7 helpline, 1-566.925.5616)    Safety  - [facility] fall prevention / home safety checklist    Community Resources  - [facility] A[facility] in Place Resource Guide (contains information about higher levels of care, homecare, etc.)  - [facility]: What is Dementia & Where to Begin brochure & Resource Guide    Other  - On-road Driving Assessment options  - Transportation Options  - [facility]: Caregiver Website QR code to scan for resources/education    Resources provided at initial assessment: Home care/Waiver/AAA, SNF list, custodial/PC list, [facility]/PC list, Care Patrol brochure, [facility] brochure, Alzheimer's Association: Scams/Crime/Fraud, [facility] Phone, Senior Center list, [facility] list, [facility]:  Health Program   decreased ability to use legs for bridging/pushing

## (undated) DEVICE — FLEX ADVANTAGE 1500CC: Brand: FLEX ADVANTAGE

## (undated) DEVICE — TRAP SPEC COLL POLYP POLYSTYR --

## (undated) DEVICE — CATH IV SAFE STR 22GX1IN BLU -- PROTECTIV PLUS

## (undated) DEVICE — REM POLYHESIVE ADULT PATIENT RETURN ELECTRODE: Brand: VALLEYLAB

## (undated) DEVICE — Device

## (undated) DEVICE — AIRLIFE™ NASAL OXYGEN CANNULA CURVED, NONFLARED TIP WITH 14 FOOT (4.3 M) CRUSH-RESISTANT TUBING, OVER-THE-EAR STYLE: Brand: AIRLIFE™

## (undated) DEVICE — Device: Brand: DISPOSABLE ELECTROSURGICAL SNARE

## (undated) DEVICE — MEDI-VAC NON-CONDUCTIVE SUCTION TUBING: Brand: CARDINAL HEALTH